# Patient Record
Sex: FEMALE | Race: WHITE | Employment: FULL TIME | URBAN - METROPOLITAN AREA
[De-identification: names, ages, dates, MRNs, and addresses within clinical notes are randomized per-mention and may not be internally consistent; named-entity substitution may affect disease eponyms.]

---
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
## 2018-03-02 ENCOUNTER — HOSPITAL ENCOUNTER (EMERGENCY)
Age: 18
Discharge: HOME | End: 2018-03-02
Attending: INTERNAL MEDICINE
Payer: SELF-PAY

## 2018-03-02 VITALS
HEART RATE: 84 BPM | SYSTOLIC BLOOD PRESSURE: 119 MMHG | HEIGHT: 67 IN | WEIGHT: 145 LBS | RESPIRATION RATE: 16 BRPM | BODY MASS INDEX: 22.76 KG/M2 | TEMPERATURE: 99.2 F | DIASTOLIC BLOOD PRESSURE: 55 MMHG | OXYGEN SATURATION: 100 %

## 2018-03-02 DIAGNOSIS — R19.7 NAUSEA VOMITING AND DIARRHEA: Primary | ICD-10-CM

## 2018-03-02 DIAGNOSIS — N39.0 UTI (URINARY TRACT INFECTION), UNCOMPLICATED: ICD-10-CM

## 2018-03-02 DIAGNOSIS — R11.2 NAUSEA VOMITING AND DIARRHEA: Primary | ICD-10-CM

## 2018-03-02 LAB
ALBUMIN SERPL-MCNC: 3.8 G/DL (ref 3.4–5)
ALBUMIN/GLOB SERPL: 1.1 {RATIO} (ref 0.8–1.7)
ALP SERPL-CCNC: 54 U/L (ref 45–117)
ALT SERPL-CCNC: 16 U/L (ref 13–56)
ANION GAP SERPL CALC-SCNC: 7 MMOL/L (ref 3–18)
APPEARANCE UR: ABNORMAL
AST SERPL-CCNC: 29 U/L (ref 15–37)
BACTERIA URNS QL MICRO: ABNORMAL /HPF
BASOPHILS # BLD: 0 K/UL (ref 0–0.06)
BASOPHILS NFR BLD: 0 % (ref 0–2)
BILIRUB SERPL-MCNC: 0.6 MG/DL (ref 0.2–1)
BILIRUB UR QL: NEGATIVE
BUN SERPL-MCNC: 11 MG/DL (ref 7–18)
BUN/CREAT SERPL: 15 (ref 12–20)
CALCIUM SERPL-MCNC: 8.3 MG/DL (ref 8.5–10.1)
CHLORIDE SERPL-SCNC: 105 MMOL/L (ref 100–108)
CO2 SERPL-SCNC: 27 MMOL/L (ref 21–32)
COLOR UR: YELLOW
CREAT SERPL-MCNC: 0.71 MG/DL (ref 0.6–1.3)
DIFFERENTIAL METHOD BLD: ABNORMAL
EOSINOPHIL # BLD: 0.1 K/UL (ref 0–0.4)
EOSINOPHIL NFR BLD: 2 % (ref 0–5)
EPITH CASTS URNS QL MICRO: ABNORMAL /LPF (ref 0–5)
ERYTHROCYTE [DISTWIDTH] IN BLOOD BY AUTOMATED COUNT: 11.6 % (ref 11.6–14.5)
GLOBULIN SER CALC-MCNC: 3.5 G/DL (ref 2–4)
GLUCOSE SERPL-MCNC: 92 MG/DL (ref 74–99)
GLUCOSE UR STRIP.AUTO-MCNC: NEGATIVE MG/DL
HCG UR QL: NEGATIVE
HCT VFR BLD AUTO: 43.8 % (ref 35–45)
HGB BLD-MCNC: 14.7 G/DL (ref 12–16)
HGB UR QL STRIP: NEGATIVE
KETONES UR QL STRIP.AUTO: ABNORMAL MG/DL
LEUKOCYTE ESTERASE UR QL STRIP.AUTO: ABNORMAL
LIPASE SERPL-CCNC: 117 U/L (ref 73–393)
LYMPHOCYTES # BLD: 1.1 K/UL (ref 0.9–3.6)
LYMPHOCYTES NFR BLD: 20 % (ref 21–52)
MAGNESIUM SERPL-MCNC: 1.9 MG/DL (ref 1.6–2.6)
MCH RBC QN AUTO: 30.4 PG (ref 24–34)
MCHC RBC AUTO-ENTMCNC: 33.6 G/DL (ref 31–37)
MCV RBC AUTO: 90.7 FL (ref 74–97)
MONOCYTES # BLD: 0.6 K/UL (ref 0.05–1.2)
MONOCYTES NFR BLD: 11 % (ref 3–10)
NEUTS SEG # BLD: 3.6 K/UL (ref 1.8–8)
NEUTS SEG NFR BLD: 67 % (ref 40–73)
NITRITE UR QL STRIP.AUTO: NEGATIVE
PH UR STRIP: 6.5 [PH] (ref 5–8)
PLATELET # BLD AUTO: 163 K/UL (ref 135–420)
PMV BLD AUTO: 10.2 FL (ref 9.2–11.8)
POTASSIUM SERPL-SCNC: 4.4 MMOL/L (ref 3.5–5.5)
PROT SERPL-MCNC: 7.3 G/DL (ref 6.4–8.2)
PROT UR STRIP-MCNC: NEGATIVE MG/DL
RBC # BLD AUTO: 4.83 M/UL (ref 4.2–5.3)
RBC #/AREA URNS HPF: ABNORMAL /HPF (ref 0–5)
SODIUM SERPL-SCNC: 139 MMOL/L (ref 136–145)
SP GR UR REFRACTOMETRY: 1.03 (ref 1–1.03)
UROBILINOGEN UR QL STRIP.AUTO: 1 EU/DL (ref 0.2–1)
WBC # BLD AUTO: 5.4 K/UL (ref 4.6–13.2)
WBC URNS QL MICRO: ABNORMAL /HPF (ref 0–5)

## 2018-03-02 PROCEDURE — 83735 ASSAY OF MAGNESIUM: CPT | Performed by: PHYSICIAN ASSISTANT

## 2018-03-02 PROCEDURE — 99282 EMERGENCY DEPT VISIT SF MDM: CPT

## 2018-03-02 PROCEDURE — 85025 COMPLETE CBC W/AUTO DIFF WBC: CPT | Performed by: PHYSICIAN ASSISTANT

## 2018-03-02 PROCEDURE — 80053 COMPREHEN METABOLIC PANEL: CPT | Performed by: PHYSICIAN ASSISTANT

## 2018-03-02 PROCEDURE — 96361 HYDRATE IV INFUSION ADD-ON: CPT

## 2018-03-02 PROCEDURE — 81025 URINE PREGNANCY TEST: CPT | Performed by: PHYSICIAN ASSISTANT

## 2018-03-02 PROCEDURE — 99283 EMERGENCY DEPT VISIT LOW MDM: CPT

## 2018-03-02 PROCEDURE — 96374 THER/PROPH/DIAG INJ IV PUSH: CPT

## 2018-03-02 PROCEDURE — 74011250636 HC RX REV CODE- 250/636: Performed by: PHYSICIAN ASSISTANT

## 2018-03-02 PROCEDURE — 81001 URINALYSIS AUTO W/SCOPE: CPT | Performed by: PHYSICIAN ASSISTANT

## 2018-03-02 PROCEDURE — 83690 ASSAY OF LIPASE: CPT | Performed by: PHYSICIAN ASSISTANT

## 2018-03-02 RX ORDER — ONDANSETRON 4 MG/1
4 TABLET, ORALLY DISINTEGRATING ORAL
Qty: 20 TAB | Refills: 0 | Status: SHIPPED | OUTPATIENT
Start: 2018-03-02 | End: 2021-01-27

## 2018-03-02 RX ORDER — ONDANSETRON 2 MG/ML
4 INJECTION INTRAMUSCULAR; INTRAVENOUS
Status: COMPLETED | OUTPATIENT
Start: 2018-03-02 | End: 2018-03-02

## 2018-03-02 RX ORDER — CIPROFLOXACIN 500 MG/1
500 TABLET ORAL 2 TIMES DAILY
Qty: 14 TAB | Refills: 0 | Status: SHIPPED | OUTPATIENT
Start: 2018-03-02 | End: 2018-03-09

## 2018-03-02 RX ADMIN — SODIUM CHLORIDE 1000 ML: 900 INJECTION, SOLUTION INTRAVENOUS at 19:16

## 2018-03-02 RX ADMIN — ONDANSETRON 4 MG: 2 INJECTION INTRAMUSCULAR; INTRAVENOUS at 19:58

## 2018-03-02 NOTE — LETTER
[facility] [place] 
[facility] 
[address] [address] 
[phone] Work/School Note Date: 3/2/2018 To Whom It May concern: 
 
Nicol Cox was seen and treated today in the emergency room by the following provider(s): 
Attending Provider: Shaun Collins MD 
Physician Assistant: LETICIA Bhat. Nicol Cox may return to work on 3/5/18.  
 
Sincerely, 
 
 
 
 
Brittany Urrutia PA-C

## 2018-03-03 NOTE — ED TRIAGE NOTES
N/V/D. Diarrhea began yesterday. Vomiting since 4 am today. Sepsis Screening completed    (  )Patient meets SIRS criteria. ( xxx )Patient does not meet SIRS criteria.       SIRS Criteria is achieved when two or more of the following are present   Temperature < 96.8°F (36°C) or > 100.9°F (38.3°C)   Heart Rate > 90 beats per minute   Respiratory Rate > 20 breaths per minute   WBC count > 12,000 or <4,000 or > 10% bands

## 2018-03-03 NOTE — DISCHARGE INSTRUCTIONS
Learning About Clear Liquid Diets  Introduction    Sometimes you have to stop eating solid food for a while. This may be because you're preparing for a medical procedure or recovering from one. Or solid food might irritate your digestive tract because of illness. But even when you can't eat solid food, your body still needs liquids and energy. A clear liquid diet gives your body basic nutrition until you can go back to eating solid food. Your body digests these foods easily, and these foods don't leave behind any solids as they pass through your system. The clear liquid diet helps reduce the nausea and diarrhea you may have while you're ill. A clear liquid diet isn't very satisfying. And it doesn't supply all the nutrients you need over the long term. But you will be on this diet for just a day or two. After that, you will probably start eating bland foods. Soon you'll be able to return to your regular diet. Examples  A clear liquid diet may include:  · Clear, fat-free broth and bouillon. · Water, ice chips, and flavored ice pops. · Strained fruit juices and flavored fruit drinks. · Fruit-flavored gelatin, like Jell-O.  · Soft drinks like lemon-lime soda and ginger ale. · Sweetened coffee or tea without cream.  Where can you learn more? Go to http://stevan-jose.info/. Enter K497 in the search box to learn more about \"Learning About Clear Liquid Diets. \"  Current as of: May 12, 2017  Content Version: 11.4  © 3364-3197 [employer]. Care instructions adapted under license by [employer] (which disclaims liability or warranty for this information). If you have questions about a medical condition or this instruction, always ask your healthcare professional. [place] [address] any warranty or liability for your use of this information.      Urinary Tract Infection in Women: Care Instructions  Your Care Instructions    A urinary tract infection, or UTI, is a general term for an infection anywhere between the kidneys and the urethra (where urine comes out). Most UTIs are bladder infections. They often cause pain or burning when you urinate. UTIs are caused by bacteria and can be cured with antibiotics. Be sure to complete your treatment so that the infection goes away. Follow-up care is a key part of your treatment and safety. Be sure to make and go to all appointments, and call your doctor if you are having problems. It's also a good idea to know your test results and keep a list of the medicines you take. How can you care for yourself at home? · Take your antibiotics as directed. Do not stop taking them just because you feel better. You need to take the full course of antibiotics. · Drink extra water and other fluids for the next day or two. This may help wash out the bacteria that are causing the infection. (If you have kidney, heart, or liver disease and have to limit fluids, talk with your doctor before you increase your fluid intake.)  · Avoid drinks that are carbonated or have caffeine. They can irritate the bladder. · Urinate often. Try to empty your bladder each time. · To relieve pain, take a hot bath or lay a heating pad set on low over your lower belly or genital area. Never go to sleep with a heating pad in place. To prevent UTIs  · Drink plenty of water each day. This helps you urinate often, which clears bacteria from your system. (If you have kidney, heart, or liver disease and have to limit fluids, talk with your doctor before you increase your fluid intake.)  · Urinate when you need to. · Urinate right after you have sex. · Change sanitary pads often. · Avoid douches, bubble baths, feminine hygiene sprays, and other feminine hygiene products that have deodorants. · After going to the bathroom, wipe from front to back. When should you call for help? Call your doctor now or seek immediate medical care if:  ?  · Symptoms such as fever, chills, nausea, or vomiting get worse or appear for the first time. ? · You have new pain in your back just below your rib cage. This is called flank pain. ? · There is new blood or pus in your urine. ? · You have any problems with your antibiotic medicine. ? Watch closely for changes in your health, and be sure to contact your doctor if:  ? · You are not getting better after taking an antibiotic for 2 days. ? · Your symptoms go away but then come back. Where can you learn more? Go to http://stevan-jose.info/. Enter Z648 in the search box to learn more about \"Urinary Tract Infection in Women: Care Instructions. \"  Current as of: May 12, 2017  Content Version: 11.4  © 7738-1849 [employer]. Care instructions adapted under license by [employer] (which disclaims liability or warranty for this information). If you have questions about a medical condition or this instruction, always ask your healthcare professional. [place] [address] any warranty or liability for your use of this information. Nausea and Vomiting: Care Instructions  Your Care Instructions    When you are nauseated, you may feel weak and sweaty and notice a lot of saliva in your mouth. Nausea often leads to vomiting. Most of the time you do not need to worry about nausea and vomiting, but they can be signs of other illnesses. Two common causes of nausea and vomiting are stomach flu and food poisoning. Nausea and vomiting from viral stomach flu will usually start to improve within 24 hours. Nausea and vomiting from food poisoning may last from 12 to 48 hours. The doctor has checked you carefully, but problems can develop later. If you notice any problems or new symptoms, get medical treatment right away. Follow-up care is a key part of your treatment and safety. Be sure to make and go to all appointments, and call your doctor if you are having problems.  It's also a good idea to know your test results and keep a list of the medicines you take. How can you care for yourself at home? · To prevent dehydration, drink plenty of fluids, enough so that your urine is light yellow or clear like water. Choose water and other caffeine-free clear liquids until you feel better. If you have kidney, heart, or liver disease and have to limit fluids, talk with your doctor before you increase the amount of fluids you drink. · Rest in bed until you feel better. · When you are able to eat, try clear soups, mild foods, and liquids until all symptoms are gone for 12 to 48 hours. Other good choices include dry toast, crackers, cooked cereal, and gelatin dessert, such as Jell-O. When should you call for help? Call 911 anytime you think you may need emergency care. For example, call if:  ? · You passed out (lost consciousness). ?Call your doctor now or seek immediate medical care if:  ? · You have symptoms of dehydration, such as:  ¨ Dry eyes and a dry mouth. ¨ Passing only a little dark urine. ¨ Feeling thirstier than usual.   ? · You have new or worsening belly pain. ? · You have a new or higher fever. ? · You vomit blood or what looks like coffee grounds. ? Watch closely for changes in your health, and be sure to contact your doctor if:  ? · You have ongoing nausea and vomiting. ? · Your vomiting is getting worse. ? · Your vomiting lasts longer than 2 days. ? · You are not getting better as expected. Where can you learn more? Go to http://stevan-jose.info/. Enter 25 [id number] in the search box to learn more about \"Nausea and Vomiting: Care Instructions. \"  Current as of: March 20, 2017  Content Version: 11.4  © 8747-8543 [employer]. Care instructions adapted under license by [employer] (which disclaims liability or warranty for this information).  If you have questions about a medical condition or this instruction, always ask your healthcare professional. [address] any warranty or liability for your use of this information.

## 2018-03-03 NOTE — ED PROVIDER NOTES
EMERGENCY DEPARTMENT HISTORY AND PHYSICAL EXAM    Date: 3/2/2018  Patient Name: Cynthia Benites    History of Presenting Illness     Chief Complaint   Patient presents with    GI Problem         History Provided By: Patient    Chief Complaint: N/V  Duration: 16 hours ago  Timing:  Constant  Severity: 9 out of 10  Associated Symptoms: subjective fever, chills, diarrhea, and HA    Additional History (Context):   8:04 PM  Cynthia Benites is a 19 y.o. female presents to the emergency department C/O constant n/v, onset 16 hours ago. Associated sxs include subjective fever, chills, gradually improving diarrhea that started yesterday, and HA. 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 LNMP 2/7/18. Pt denies eating questionable foods, previous abd surgeries, bloody stool, and any other sxs or complaints. PCP: Nicole Campbell MD    Current Outpatient Prescriptions   Medication Sig Dispense Refill    ciprofloxacin HCl (CIPRO) 500 mg tablet Take 1 Tab by mouth two (2) times a day for 7 days. 14 Tab 0    ondansetron (ZOFRAN ODT) 4 mg disintegrating tablet Take 1 Tab by mouth every eight (8) hours as needed for Nausea. 20 Tab 0       Past History     Past Medical History:  Past Medical History:   Diagnosis Date    Ovarian cyst        Past Surgical History:  History reviewed. No pertinent surgical history. Family History:  History reviewed. No pertinent family history. Social History:  Social History   Substance Use Topics    Smoking status: Current Every Day Smoker     Packs/day: 0.50    Smokeless tobacco: Never Used    Alcohol use No       Allergies: Allergies   Allergen Reactions    Amoxicillin Hives and Swelling         Review of Systems   Review of Systems   Constitutional: Positive for chills and fever (subjective). Gastrointestinal: Positive for diarrhea, nausea and vomiting. Negative for blood in stool. Neurological: Positive for headaches.    All other systems reviewed and are negative. Physical Exam     Vitals:    03/02/18 1913   BP: 107/66   Pulse: 92   Resp: 16   Temp: 99.2 °F (37.3 °C)   SpO2: 100%   Weight: 65.8 kg (145 lb)   Height: 5' 7\" (1.702 m)     Physical Exam   Constitutional: She is oriented to person, place, and time. She appears well-developed and well-nourished. No distress. HENT:   Head: Normocephalic and atraumatic. Eyes: Conjunctivae and EOM are normal. Pupils are equal, round, and reactive to light. Neck: Normal range of motion. Neck supple. Cardiovascular: Normal rate and regular rhythm. Pulmonary/Chest: Effort normal and breath sounds normal.   Abdominal: Soft. Bowel sounds are normal. She exhibits no distension. There is no tenderness. There is no rebound and no guarding. Musculoskeletal: Normal range of motion. Neurological: She is alert and oriented to person, place, and time. Skin: Skin is warm and dry. Psychiatric: She has a normal mood and affect. Her behavior is normal.   Nursing note and vitals reviewed.         Diagnostic Study Results     Labs -     Recent Results (from the past 12 hour(s))   URINALYSIS W/ RFLX MICROSCOPIC    Collection Time: 03/02/18  7:16 PM   Result Value Ref Range    Color YELLOW      Appearance CLOUDY      Specific gravity 1.028 1.005 - 1.030      pH (UA) 6.5 5.0 - 8.0      Protein NEGATIVE  NEG mg/dL    Glucose NEGATIVE  NEG mg/dL    Ketone TRACE (A) NEG mg/dL    Bilirubin NEGATIVE  NEG      Blood NEGATIVE  NEG      Urobilinogen 1.0 0.2 - 1.0 EU/dL    Nitrites NEGATIVE  NEG      Leukocyte Esterase TRACE (A) NEG     HCG URINE, QL    Collection Time: 03/02/18  7:16 PM   Result Value Ref Range    HCG urine, QL NEGATIVE  NEG     URINE MICROSCOPIC ONLY    Collection Time: 03/02/18  7:16 PM   Result Value Ref Range    WBC 2 to 6 0 - 5 /hpf    RBC 0 to 3 0 - 5 /hpf    Epithelial cells 2+ 0 - 5 /lpf    Bacteria 2+ (A) NEG /hpf   CBC WITH AUTOMATED DIFF    Collection Time: 03/02/18  7:45 PM   Result Value Ref Range WBC 5.4 4.6 - 13.2 K/uL    RBC 4.83 4.20 - 5.30 M/uL    HGB 14.7 12.0 - 16.0 g/dL    HCT 43.8 35.0 - 45.0 %    MCV 90.7 74.0 - 97.0 FL    MCH 30.4 24.0 - 34.0 PG    MCHC 33.6 31.0 - 37.0 g/dL    RDW 11.6 11.6 - 14.5 %    PLATELET 954 578 - 310 K/uL    MPV 10.2 9.2 - 11.8 FL    NEUTROPHILS 67 40 - 73 %    LYMPHOCYTES 20 (L) 21 - 52 %    MONOCYTES 11 (H) 3 - 10 %    EOSINOPHILS 2 0 - 5 %    BASOPHILS 0 0 - 2 %    ABS. NEUTROPHILS 3.6 1.8 - 8.0 K/UL    ABS. LYMPHOCYTES 1.1 0.9 - 3.6 K/UL    ABS. MONOCYTES 0.6 0.05 - 1.2 K/UL    ABS. EOSINOPHILS 0.1 0.0 - 0.4 K/UL    ABS. BASOPHILS 0.0 0.0 - 0.06 K/UL    DF AUTOMATED     METABOLIC PANEL, COMPREHENSIVE    Collection Time: 03/02/18  7:45 PM   Result Value Ref Range    Sodium 139 136 - 145 mmol/L    Potassium 4.4 3.5 - 5.5 mmol/L    Chloride 105 100 - 108 mmol/L    CO2 27 21 - 32 mmol/L    Anion gap 7 3.0 - 18 mmol/L    Glucose 92 74 - 99 mg/dL    BUN 11 7.0 - 18 MG/DL    Creatinine 0.71 0.6 - 1.3 MG/DL    BUN/Creatinine ratio 15 12 - 20      GFR est AA >60 >60 ml/min/1.73m2    GFR est non-AA >60 >60 ml/min/1.73m2    Calcium 8.3 (L) 8.5 - 10.1 MG/DL    Bilirubin, total 0.6 0.2 - 1.0 MG/DL    ALT (SGPT) 16 13 - 56 U/L    AST (SGOT) 29 15 - 37 U/L    Alk. phosphatase 54 45 - 117 U/L    Protein, total 7.3 6.4 - 8.2 g/dL    Albumin 3.8 3.4 - 5.0 g/dL    Globulin 3.5 2.0 - 4.0 g/dL    A-G Ratio 1.1 0.8 - 1.7     LIPASE    Collection Time: 03/02/18  7:45 PM   Result Value Ref Range    Lipase 117 73 - 393 U/L       Radiologic Studies -   No orders to display     CT Results  (Last 48 hours)    None        CXR Results  (Last 48 hours)    None          MEDICATIONS GIVEN:  Medications   sodium chloride 0.9 % bolus infusion 1,000 mL (1,000 mL IntraVENous New Bag 3/2/18 1916)   ondansetron (ZOFRAN) injection 4 mg (4 mg IntraVENous Given 3/2/18 1958)        Medical Decision Making   I am the first provider for this patient.     I reviewed the vital signs, available nursing notes, past medical history, past surgical history, family history and social history. Vital Signs-Reviewed the patient's vital signs. Pulse Oximetry Analysis - 100% on RA     Records Reviewed: Nursing Notes    Provider Notes (Medical Decision Making):     Procedures:  Procedures    ED Course:   8:04 PM Initial assessment performed. The patients presenting problems have been discussed, and they are in agreement with the care plan formulated and outlined with them. I have encouraged them to ask questions as they arise throughout their visit. Diagnosis and Disposition       DISCHARGE NOTE:  8:38 PM  Wanda Moe's  results have been reviewed with her. She has been counseled regarding her diagnosis, treatment, and plan. She verbally conveys understanding and agreement of the signs, symptoms, diagnosis, treatment and prognosis and additionally agrees to follow up as discussed. She also agrees with the care-plan and conveys that all of her questions have been answered. I have also provided discharge instructions for her that include: educational information regarding their diagnosis and treatment, and list of reasons why they would want to return to the ED prior to their follow-up appointment, should her condition change. She has been provided with education for proper emergency department utilization. CLINICAL IMPRESSION:    1. Nausea vomiting and diarrhea    2. UTI (urinary tract infection), uncomplicated        PLAN:  1. D/C Home  2. Current Discharge Medication List      START taking these medications    Details   ciprofloxacin HCl (CIPRO) 500 mg tablet Take 1 Tab by mouth two (2) times a day for 7 days. Qty: 14 Tab, Refills: 0      ondansetron (ZOFRAN ODT) 4 mg disintegrating tablet Take 1 Tab by mouth every eight (8) hours as needed for Nausea. Qty: 20 Tab, Refills: 0           3.    Follow-up Information     Follow up With Details Comments Contact Helen Weber MD Schedule an appointment as soon as possible for a visit in 2 days  [address]      [facility] EM[facility]  As needed, if symptoms worsen [address] [place] [id number]  [phone]          _______________________________   Attestations:     SCRIBE ATTESTATION:  This note is prepared by Kosta Santiago, acting as Scribe for Bebeto Barnett PA-C.    PROVIDER ATTESTATION:  Bebeto Barnett PA-C: The scribe's documentation has been prepared under my direction and personally reviewed by me in its entirety.  I confirm that the note above accurately reflects all work, treatment, procedures, and medical decision making performed by me.   _______________________________

## 2019-07-14 ENCOUNTER — HOSPITAL ENCOUNTER (EMERGENCY)
Age: 19
Discharge: HOME | End: 2019-07-14
Attending: EMERGENCY MEDICINE
Payer: SELF-PAY

## 2019-07-14 VITALS
OXYGEN SATURATION: 100 % | BODY MASS INDEX: 24.33 KG/M2 | SYSTOLIC BLOOD PRESSURE: 115 MMHG | HEART RATE: 98 BPM | DIASTOLIC BLOOD PRESSURE: 80 MMHG | TEMPERATURE: 98.9 F | WEIGHT: 155 LBS | RESPIRATION RATE: 16 BRPM | HEIGHT: 67 IN

## 2019-07-14 DIAGNOSIS — B34.9 VIRAL SYNDROME: Primary | ICD-10-CM

## 2019-07-14 PROCEDURE — 99282 EMERGENCY DEPT VISIT SF MDM: CPT

## 2019-07-14 RX ORDER — LORATADINE AND PSEUDOEPHEDRINE 10; 240 MG/1; MG/1
1 TABLET, EXTENDED RELEASE ORAL DAILY
Qty: 12 TAB | Refills: 0 | Status: SHIPPED | OUTPATIENT
Start: 2019-07-14 | End: 2021-01-27

## 2019-07-14 RX ORDER — FLUTICASONE PROPIONATE 50 MCG
2 SPRAY, SUSPENSION (ML) NASAL DAILY
Qty: 1 BOTTLE | Refills: 0 | Status: SHIPPED | OUTPATIENT
Start: 2019-07-14 | End: 2021-01-27

## 2019-07-14 RX ORDER — NAPROXEN 500 MG/1
500 TABLET ORAL 2 TIMES DAILY WITH MEALS
Qty: 20 TAB | Refills: 0 | Status: SHIPPED | OUTPATIENT
Start: 2019-07-14 | End: 2019-07-24

## 2019-07-14 NOTE — ED PROVIDER NOTES
EMERGENCY DEPARTMENT HISTORY AND PHYSICAL EXAM    Date: 7/14/2019  Patient Name: Alan Reaves    History of Presenting Illness     Chief Complaint   Patient presents with    Nasal Congestion    Sore Throat    Fever         History Provided By: Patient    Alan Reaves is a 22 y.o. female who presents to the emergency department C/O congestion. Associated sxs include runny nose, postnasal drainage, sore throat. Patient reports 24 hours of head congestion postnasal drip sore throat. Patient endorses a sick contacts she has a child at home with similar cold-like symptoms. She states she was unable to go to work today which from to the ER visit. Patient reports having history of strep throat and today's sore throat feels nothing like that. Pt denies fever, cough, nausea vomiting, and any other sxs or complaints. PCP: Jackie Peterson MD    Current Outpatient Medications   Medication Sig Dispense Refill    loratadine-pseudoephedrine (CLARITIN-D 24 HOUR)  mg per tablet Take 1 Tab by mouth daily. 12 Tab 0    fluticasone propionate (FLONASE) 50 mcg/actuation nasal spray 2 Sprays by Both Nostrils route daily. 1 Bottle 0    naproxen (NAPROSYN) 500 mg tablet Take 1 Tab by mouth two (2) times daily (with meals) for 10 days. 20 Tab 0    ondansetron (ZOFRAN ODT) 4 mg disintegrating tablet Take 1 Tab by mouth every eight (8) hours as needed for Nausea. 20 Tab 0       Past History     Past Medical History:  Past Medical History:   Diagnosis Date    Ovarian cyst        Past Surgical History:  History reviewed. No pertinent surgical history. Family History:  History reviewed. No pertinent family history. Social History:  Social History     Tobacco Use    Smoking status: Current Every Day Smoker     Packs/day: 0.50    Smokeless tobacco: Never Used   Substance Use Topics    Alcohol use: No    Drug use: Not on file       Allergies:   Allergies   Allergen Reactions    Amoxicillin Hives and Swelling         Review of Systems   Review of Systems   Constitutional: Negative for fever. HENT: Positive for congestion, postnasal drip, rhinorrhea and sore throat. Respiratory: Negative for cough. Skin: Negative for rash. All other systems reviewed and are negative. Physical Exam     Vitals:    07/14/19 1059   BP: 115/80   Pulse: 98   Resp: 16   Temp: 98.9 °F (37.2 °C)   SpO2: 100%   Weight: 70.3 kg (155 lb)   Height: 5' 7\" (1.702 m)     Physical Exam   Constitutional: She is oriented to person, place, and time. She appears well-developed and well-nourished. No distress. HENT:   Head: Normocephalic and atraumatic. Right Ear: External ear normal.   Mouth/Throat: Oropharynx is clear and moist. No oropharyngeal exudate. Audible nasal congestion noted   Eyes: Pupils are equal, round, and reactive to light. Conjunctivae and EOM are normal.   Neck: Normal range of motion. Neck supple. Cardiovascular: Normal rate and regular rhythm. Pulmonary/Chest: Effort normal and breath sounds normal.   Musculoskeletal: Normal range of motion. Lymphadenopathy:     She has no cervical adenopathy. Neurological: She is alert and oriented to person, place, and time. Skin: Skin is warm and dry. Psychiatric: She has a normal mood and affect. Her behavior is normal.   Nursing note and vitals reviewed. Diagnostic Study Results     Labs -   No results found for this or any previous visit (from the past 12 hour(s)). Radiologic Studies -   No orders to display     CT Results  (Last 48 hours)    None        CXR Results  (Last 48 hours)    None          Medications given in the ED-  Medications - No data to display      Medical Decision Making   I am the first provider for this patient. I reviewed the vital signs, available nursing notes, past medical history, past surgical history, family history and social history. Vital Signs-Reviewed the patient's vital signs.     Records Reviewed: Nursing Notes    Procedures:  Procedures    ED Course:   11:12 AM   Initial assessment performed. The patients presenting problems have been discussed, and they are in agreement with the care plan formulated and outlined with them. I have encouraged them to ask questions as they arise throughout their visit. Patient offered strep throat testing with a pharyngeal swab patient politely declined. Discussion: 22 y.o. female 24 hours of URI type symptoms. Patient afebrile nontoxic-appearing exam consistent with likely viral illness. Will plan for supportive care and PCP follow-up with strict return precautions discussed. Work note provided at patient request        Diagnosis and Disposition       DISCHARGE NOTE:  Liliana Blue  results have been reviewed with her. She has been counseled regarding her diagnosis, treatment, and plan. She verbally conveys understanding and agreement of the signs, symptoms, diagnosis, treatment and prognosis and additionally agrees to follow up as discussed. She also agrees with the care-plan and conveys that all of her questions have been answered. I have also provided discharge instructions for her that include: educational information regarding their diagnosis and treatment, and list of reasons why they would want to return to the ED prior to their follow-up appointment, should her condition change. She has been provided with education for proper emergency department utilization. CLINICAL IMPRESSION:    1. Viral syndrome        PLAN:  1. D/C Home  2. Current Discharge Medication List      START taking these medications    Details   loratadine-pseudoephedrine (CLARITIN-D 24 HOUR)  mg per tablet Take 1 Tab by mouth daily. Qty: 12 Tab, Refills: 0      fluticasone propionate (FLONASE) 50 mcg/actuation nasal spray 2 Sprays by Both Nostrils route daily.   Qty: 1 Bottle, Refills: 0      naproxen (NAPROSYN) 500 mg tablet Take 1 Tab by mouth two (2) times daily (with meals) for 10 days. Qty: 20 Tab, Refills: 0           3. Follow-up Information     Follow up With Specialties Details Why Contact Info    Kasi Cabrera MD Family Practice Schedule an appointment as soon as possible for a visit  [address] [place] [address]      [facility] EMERGENCY DEPT Emergency Medicine  As needed, If symptoms worsen [address] [place] [address]  [phone]              Please note that this dictation was completed with PROSimity, the computer voice recognition software. Quite often unanticipated grammatical, syntax, homophones, and other interpretive errors are inadvertently transcribed by the computer software. Please disregard these errors. Please excuse any errors that have escaped final proofreading.

## 2019-07-14 NOTE — LETTER
[facility] [place] 
[facility] EMERGENCY DEPT 
[address] [address] 
[phone] Work/School Note Date: 7/14/2019 To Whom It May concern: 
 
Jaqueline Fleischer was seen and treated today in the emergency room by the following provider(s): 
Attending Provider: Faye Sosa MD 
Physician Assistant: LETICIA Brown. Jaqueline Fleischer may return to work on 7/16/19 Sincerely, 
 
 
 
 
LETICIA Dumas

## 2019-07-14 NOTE — DISCHARGE INSTRUCTIONS
Patient Education        Viral Infections: Care Instructions  Your Care Instructions    You don't feel well, but it's not clear what's causing it. You may have a viral infection. Viruses cause many illnesses, such as the common cold, influenza, fever, rashes, and the diarrhea, nausea, and vomiting that are often called \"stomach flu. \" You may wonder if antibiotic medicines could make you feel better. But antibiotics only treat infections caused by bacteria. They don't work on viruses. The good news is that viral infections usually aren't serious. Most will go away in a few days without medical treatment. In the meantime, there are a few things you can do to make yourself more comfortable. Follow-up care is a key part of your treatment and safety. Be sure to make and go to all appointments, and call your doctor if you are having problems. It's also a good idea to know your test results and keep a list of the medicines you take. How can you care for yourself at home? · Get plenty of rest if you feel tired. · Take an over-the-counter pain medicine if needed, such as acetaminophen (Tylenol), ibuprofen (Advil, Motrin), or naproxen (Aleve). Read and follow all instructions on the label. · Be careful when taking over-the-counter cold or flu medicines and Tylenol at the same time. Many of these medicines have acetaminophen, which is Tylenol. Read the labels to make sure that you are not taking more than the recommended dose. Too much acetaminophen (Tylenol) can be harmful. · Drink plenty of fluids, enough so that your urine is light yellow or clear like water. If you have kidney, heart, or liver disease and have to limit fluids, talk with your doctor before you increase the amount of fluids you drink. · Stay home from work, school, and other public places while you have a fever. When should you call for help? Call 911 anytime you think you may need emergency care.  For example, call if:    · You have severe trouble breathing.     · You passed out (lost consciousness).    Call your doctor now or seek immediate medical care if:    · You seem to be getting much sicker.     · You have a new or higher fever.     · You have blood in your stools.     · You have new belly pain, or your pain gets worse.     · You have a new rash.    Watch closely for changes in your health, and be sure to contact your doctor if:    · You start to get better and then get worse.     · You do not get better as expected. Where can you learn more? Go to http://stevan-jose.info/. Enter I069 in the search box to learn more about \"Viral Infections: Care Instructions. \"  Current as of: July 30, 2018  Content Version: 11.9  © 2809-3181 [employer], [employer]. Care instructions adapted under license by [employer] (which disclaims liability or warranty for this information). If you have questions about a medical condition or this instruction, always ask your healthcare professional. [address] any warranty or liability for your use of this information.

## 2021-01-27 ENCOUNTER — APPOINTMENT (OUTPATIENT)
Dept: ULTRASOUND IMAGING | Age: 21
End: 2021-01-27
Attending: PHYSICIAN ASSISTANT
Payer: MEDICAID

## 2021-01-27 ENCOUNTER — HOSPITAL ENCOUNTER (EMERGENCY)
Age: 21
Discharge: HOME | End: 2021-01-27
Attending: EMERGENCY MEDICINE
Payer: MEDICAID

## 2021-01-27 VITALS
HEART RATE: 81 BPM | WEIGHT: 154 LBS | BODY MASS INDEX: 24.17 KG/M2 | DIASTOLIC BLOOD PRESSURE: 69 MMHG | HEIGHT: 67 IN | OXYGEN SATURATION: 99 % | TEMPERATURE: 98.2 F | RESPIRATION RATE: 18 BRPM | SYSTOLIC BLOOD PRESSURE: 121 MMHG

## 2021-01-27 DIAGNOSIS — O21.9 NAUSEA AND VOMITING DURING PREGNANCY PRIOR TO 22 WEEKS GESTATION: ICD-10-CM

## 2021-01-27 DIAGNOSIS — O20.0 THREATENED MISCARRIAGE IN EARLY PREGNANCY: Primary | ICD-10-CM

## 2021-01-27 LAB
ABO + RH BLD: NORMAL
ANION GAP SERPL CALC-SCNC: 4 MMOL/L (ref 3–18)
APPEARANCE UR: CLEAR
BASOPHILS # BLD: 0 K/UL (ref 0–0.1)
BASOPHILS NFR BLD: 0 % (ref 0–2)
BILIRUB UR QL: NEGATIVE
BUN SERPL-MCNC: 5 MG/DL (ref 7–18)
BUN/CREAT SERPL: 8 (ref 12–20)
CALCIUM SERPL-MCNC: 9.9 MG/DL (ref 8.5–10.1)
CHLORIDE SERPL-SCNC: 105 MMOL/L (ref 100–111)
CO2 SERPL-SCNC: 27 MMOL/L (ref 21–32)
COLOR UR: YELLOW
CREAT SERPL-MCNC: 0.63 MG/DL (ref 0.6–1.3)
DIFFERENTIAL METHOD BLD: ABNORMAL
EOSINOPHIL # BLD: 0 K/UL (ref 0–0.4)
EOSINOPHIL NFR BLD: 0 % (ref 0–5)
ERYTHROCYTE [DISTWIDTH] IN BLOOD BY AUTOMATED COUNT: 11.8 % (ref 11.6–14.5)
GLUCOSE SERPL-MCNC: 83 MG/DL (ref 74–99)
GLUCOSE UR STRIP.AUTO-MCNC: NEGATIVE MG/DL
HCG SERPL-ACNC: 4187 MIU/ML (ref 0–10)
HCG UR QL: POSITIVE
HCT VFR BLD AUTO: 44.2 % (ref 35–45)
HGB BLD-MCNC: 15.2 G/DL (ref 12–16)
HGB UR QL STRIP: NEGATIVE
KETONES UR QL STRIP.AUTO: 40 MG/DL
LEUKOCYTE ESTERASE UR QL STRIP.AUTO: NEGATIVE
LYMPHOCYTES # BLD: 1.4 K/UL (ref 0.9–3.6)
LYMPHOCYTES NFR BLD: 19 % (ref 21–52)
MCH RBC QN AUTO: 30.9 PG (ref 24–34)
MCHC RBC AUTO-ENTMCNC: 34.4 G/DL (ref 31–37)
MCV RBC AUTO: 89.8 FL (ref 74–97)
MONOCYTES # BLD: 0.4 K/UL (ref 0.05–1.2)
MONOCYTES NFR BLD: 5 % (ref 3–10)
NEUTS SEG # BLD: 5.5 K/UL (ref 1.8–8)
NEUTS SEG NFR BLD: 76 % (ref 40–73)
NITRITE UR QL STRIP.AUTO: NEGATIVE
PH UR STRIP: 7 [PH] (ref 5–8)
PLATELET # BLD AUTO: 268 K/UL (ref 135–420)
PMV BLD AUTO: 10.3 FL (ref 9.2–11.8)
POTASSIUM SERPL-SCNC: 3.6 MMOL/L (ref 3.5–5.5)
PROT UR STRIP-MCNC: NEGATIVE MG/DL
RBC # BLD AUTO: 4.92 M/UL (ref 4.2–5.3)
SERVICE CMNT-IMP: NORMAL
SODIUM SERPL-SCNC: 136 MMOL/L (ref 136–145)
SP GR UR REFRACTOMETRY: 1.01 (ref 1–1.03)
UROBILINOGEN UR QL STRIP.AUTO: 0.2 EU/DL (ref 0.2–1)
WBC # BLD AUTO: 7.4 K/UL (ref 4.6–13.2)
WET PREP GENITAL: NORMAL

## 2021-01-27 PROCEDURE — 87210 SMEAR WET MOUNT SALINE/INK: CPT

## 2021-01-27 PROCEDURE — 87591 N.GONORRHOEAE DNA AMP PROB: CPT

## 2021-01-27 PROCEDURE — 84702 CHORIONIC GONADOTROPIN TEST: CPT

## 2021-01-27 PROCEDURE — 99284 EMERGENCY DEPT VISIT MOD MDM: CPT

## 2021-01-27 PROCEDURE — 85025 COMPLETE CBC W/AUTO DIFF WBC: CPT

## 2021-01-27 PROCEDURE — 86900 BLOOD TYPING SEROLOGIC ABO: CPT

## 2021-01-27 PROCEDURE — 93975 VASCULAR STUDY: CPT

## 2021-01-27 PROCEDURE — 81003 URINALYSIS AUTO W/O SCOPE: CPT

## 2021-01-27 PROCEDURE — 81025 URINE PREGNANCY TEST: CPT

## 2021-01-27 PROCEDURE — 80048 BASIC METABOLIC PNL TOTAL CA: CPT

## 2021-01-27 RX ORDER — ONDANSETRON 4 MG/1
4 TABLET, ORALLY DISINTEGRATING ORAL
Qty: 20 TAB | Refills: 0 | Status: SHIPPED | OUTPATIENT
Start: 2021-01-27

## 2021-01-27 NOTE — LETTER
NOTIFICATION RETURN TO WORK / SCHOOL 
 
1/27/2021 5:43 PM 
 
Ms. hCris Rodo Gloria 
[address] [place] [id number] To Whom It May Concern: 
 
Eldno Wilburn is currently under the care of [facility] EMERGENCY DEPT. She will return to work/school on:1/30/2021 If there are questions or concerns please have the patient contact our office. Sincerely, 
 
 
Jameel Cartagena

## 2021-01-27 NOTE — ED NOTES
Verbal shift change report given to Ansley Henao RN (oncoming nurse) by Dino Montejo RN (offgoing nurse). Report included the following information SBAR, Kardex, ED Summary, [facility] and Recent Results.

## 2021-01-27 NOTE — ED PROVIDER NOTES
EMERGENCY DEPARTMENT HISTORY AND PHYSICAL EXAM    Date: 1/27/2021  Patient Name: Wanda Moe    History of Presenting Illness     Chief Complaint   Patient presents with   • Pregnancy Problem         History Provided By: Patient    2:01 PM  Wanda Moe is a 21 y.o. female who presents to the emergency department C/O pelvic cramping and vaginal spotting which began yesterday.  Patient states her last menstrual period was 12/17/2020, has had positive home pregnancy test.  Does not know her blood type.  This would be her first pregnancy.  Spotting less today, appeared brownish per patient. Pt denies fever, nausea, vomiting, diarrhea, constipation, dysuria, concerns for STDs and any other sxs or complaints.     PCP: Other, MD Tanja        Past History     Past Medical History:  Past Medical History:   Diagnosis Date   • Ovarian cyst        Past Surgical History:  History reviewed. No pertinent surgical history.    Family History:  History reviewed. No pertinent family history.    Social History:  Social History     Tobacco Use   • Smoking status: Current Every Day Smoker     Packs/day: 0.50   • Smokeless tobacco: Never Used   Substance Use Topics   • Alcohol use: No   • Drug use: Never       Allergies:  Allergies   Allergen Reactions   • Amoxicillin Hives and Swelling         Review of Systems   Review of Systems   Constitutional: Negative for fever.   Gastrointestinal: Positive for abdominal pain. Negative for diarrhea, nausea and vomiting.   Genitourinary: Positive for vaginal bleeding. Negative for dysuria.   All other systems reviewed and are negative.        Physical Exam     Vitals:    01/27/21 1329   BP: 121/69   Pulse: 81   Resp: 18   Temp: 98.2 °F (36.8 °C)   SpO2: 99%   Weight: 69.9 kg (154 lb)   Height: 5' 7\" (1.702 m)     Physical Exam  Vital signs and nursing notes reviewed.    CONSTITUTIONAL: Alert. Well-appearing; well-nourished; in no apparent distress.  HEAD: Normocephalic; atraumatic.          CV: Normal S1, S2; no murmurs, rubs, or gallops. RESPIRATORY: Normal chest excursion with respiration; breath sounds clear and equal bilaterally; no wheezes, rhonchi, or rales. GI: Normal bowel sounds; non-distended; +mild suprapubic tenderness; no guarding or rigidity; no palpable organomegaly. No CVA tenderness. PELVIC: Normal external genitalia, without rash or lesions. Speculum exam: Normal appearing cervix. No discharge noted. Vaginal walls without lesion. Bimanual exam: No CMT, cervical os closed. No uterine tenderness. Mild left adnexal tenderness, no mass. No adnexal tenderness or mass palpated. SKIN: Normal for age and race; warm; dry; good turgor; no apparent lesions or exudate. NEURO: A & O x3. Cranial nerves 2-12 intact. Motor 5/5 bilaterally. Sensation intact. PSYCH:  Mood and affect appropriate.          Diagnostic Study Results     Labs -     Recent Results (from the past 12 hour(s))   URINALYSIS W/ RFLX MICROSCOPIC    Collection Time: 01/27/21  1:55 PM   Result Value Ref Range    Color YELLOW      Appearance CLEAR      Specific gravity 1.008 1.005 - 1.030      pH (UA) 7.0 5.0 - 8.0      Protein Negative NEG mg/dL    Glucose Negative NEG mg/dL    Ketone 40 (A) NEG mg/dL    Bilirubin Negative NEG      Blood Negative NEG      Urobilinogen 0.2 0.2 - 1.0 EU/dL    Nitrites Negative NEG      Leukocyte Esterase Negative NEG     HCG URINE, QL    Collection Time: 01/27/21  1:55 PM   Result Value Ref Range    HCG urine, QL Positive (A) NEG     WET PREP    Collection Time: 01/27/21  2:05 PM    Specimen: Vaginal Specimen   Result Value Ref Range    Special Requests: NO SPECIAL REQUESTS      Wet prep NO YEAST,TRICHOMONAS OR CLUE CELLS NOTED     CBC WITH AUTOMATED DIFF    Collection Time: 01/27/21  2:35 PM   Result Value Ref Range    WBC 7.4 4.6 - 13.2 K/uL    RBC 4.92 4.20 - 5.30 M/uL    HGB 15.2 12.0 - 16.0 g/dL    HCT 44.2 35.0 - 45.0 %    MCV 89.8 74.0 - 97.0 FL    MCH 30.9 24.0 - 34.0 PG    MCHC 34.4 31.0 - 37.0 g/dL    RDW 11.8 11.6 - 14.5 %    PLATELET 676 752 - 420 K/uL    MPV 10.3 9.2 - 11.8 FL    NEUTROPHILS 76 (H) 40 - 73 %    LYMPHOCYTES 19 (L) 21 - 52 %    MONOCYTES 5 3 - 10 %    EOSINOPHILS 0 0 - 5 %    BASOPHILS 0 0 - 2 %    ABS. NEUTROPHILS 5.5 1.8 - 8.0 K/UL    ABS. LYMPHOCYTES 1.4 0.9 - 3.6 K/UL    ABS. MONOCYTES 0.4 0.05 - 1.2 K/UL    ABS. EOSINOPHILS 0.0 0.0 - 0.4 K/UL    ABS. BASOPHILS 0.0 0.0 - 0.1 K/UL    DF AUTOMATED     METABOLIC PANEL, BASIC    Collection Time: 01/27/21  2:35 PM   Result Value Ref Range    Sodium 136 136 - 145 mmol/L    Potassium 3.6 3.5 - 5.5 mmol/L    Chloride 105 100 - 111 mmol/L    CO2 27 21 - 32 mmol/L    Anion gap 4 3.0 - 18 mmol/L    Glucose 83 74 - 99 mg/dL    BUN 5 (L) 7.0 - 18 MG/DL    Creatinine 0.63 0.6 - 1.3 MG/DL    BUN/Creatinine ratio 8 (L) 12 - 20      GFR est AA >60 >60 ml/min/1.73m2    GFR est non-AA >60 >60 ml/min/1.73m2    Calcium 9.9 8.5 - 10.1 MG/DL   BETA HCG, QT    Collection Time: 01/27/21  2:35 PM   Result Value Ref Range    Beta HCG, QT 4,187 (H) 0 - 10 MIU/ML   BLOOD TYPE, (ABO+RH)    Collection Time: 01/27/21  3:05 PM   Result Value Ref Range    ABO/Rh(D) B POSITIVE        Radiologic Studies -   US OB TV W DOPPLER   Final Result      1. Single intrauterine gestational sac without evidence of yolk sac or fetal   pole. Findings may reflect changes of early gestation. Recommend dedicated   clinical follow-up, trending of beta hCG levels, and repeat ultrasound   assessment to document viable intrauterine pregnancy. 2. Normal blood flow to each ovary. Likely right ovarian corpus luteum. CT Results  (Last 48 hours)    None        CXR Results  (Last 48 hours)    None          Medications given in the ED-  Medications - No data to display      Medical Decision Making   I am the first provider for this patient.     I reviewed the vital signs, available nursing notes, past medical history, past surgical history, family history and social history. Vital Signs-Reviewed the patient's vital signs. Records Reviewed: Nursing Notes      Procedures:  Procedures    ED Course:  2:01 PM   Initial assessment performed. The patients presenting problems have been discussed, and they are in agreement with the care plan formulated and outlined with them. I have encouraged them to ask questions as they arise throughout their visit. 4 PM   Care will be transferred from Rishabh Gil PA-C to Jameel Cartagena PA-C to await OB ultrasound and final disposition. 5:10 PM  Reviewed ultrasound findings with patient. Threatened miscarriage ductions given. Patient to return to this facility 2 days for repeat quant. Also recommended OB/GYN follow-up for her she does not have one in mind as of yet. Provider Notes (Medical Decision Making): Graciela Cook is a 19 y.o. female with complaint of pelvic cramping and vaginal spotting for 24 hours with home UPT. Primigravida with stable vitals benign physical exam normal labs. Quant above 4000 with an ultrasound with sac no heartbeat yet. Likely threatened miscarriage perhaps early pregnancy. Return to this facility in 2 days for repeat quant. GC chlamydia cultures pending. Diagnosis and Disposition       DISCHARGE NOTE:    Terrence Wilkinson  results have been reviewed with her. She has been counseled regarding her diagnosis, treatment, and plan. She verbally conveys understanding and agreement of the signs, symptoms, diagnosis, treatment and prognosis and additionally agrees to follow up as discussed. She also agrees with the care-plan and conveys that all of her questions have been answered. I have also provided discharge instructions for her that include: educational information regarding their diagnosis and treatment, and list of reasons why they would want to return to the ED prior to their follow-up appointment, should her condition change.  She has been provided with education for proper emergency department utilization. CLINICAL IMPRESSION:    1. Threatened miscarriage in early pregnancy    2. Nausea and vomiting during pregnancy prior to 22 weeks gestation        PLAN:  1. D/C Home  2. Current Discharge Medication List      START taking these medications    Details   ondansetron (Zofran ODT) 4 mg disintegrating tablet 1 Tab by SubLINGual route every eight (8) hours as needed for Nausea or Vomiting. Qty: 20 Tab, Refills: 0           3. Follow-up Information     Follow up With Specialties Details Why Ck Erwin MD Obstetrics & Gynecology, Gynecology, Obstetrics Schedule an appointment as soon as possible for a visit   [address] [place] [address] Drive [address] [phone]      [facility] EMERGENCY DEPT Emergency Medicine Go in 2 days for repeat HCG [address] [id number]  [phone]        _______________________________      Please note that this dictation was completed with [employer], the computer voice recognition software. Quite often unanticipated grammatical, syntax, homophones, and other interpretive errors are inadvertently transcribed by the computer software. Please disregard these errors. Please excuse any errors that have escaped final proofreading.

## 2021-01-27 NOTE — ED TRIAGE NOTES
Pt states \" I missed my period and then took 2 tests an they were positive. I went to work last night and I started cramping then last night I noticed blood I got up this morning and went to work and I am cramping and spotting brown blood. \"

## 2021-01-29 ENCOUNTER — HOSPITAL ENCOUNTER (EMERGENCY)
Age: 21
Discharge: HOME | End: 2021-01-29
Attending: EMERGENCY MEDICINE
Payer: MEDICAID

## 2021-01-29 VITALS
TEMPERATURE: 98.4 F | HEART RATE: 84 BPM | DIASTOLIC BLOOD PRESSURE: 80 MMHG | RESPIRATION RATE: 17 BRPM | OXYGEN SATURATION: 100 % | SYSTOLIC BLOOD PRESSURE: 121 MMHG

## 2021-01-29 DIAGNOSIS — O20.0 THREATENED MISCARRIAGE: Primary | ICD-10-CM

## 2021-01-29 LAB
BASOPHILS # BLD: 0 K/UL (ref 0–0.1)
BASOPHILS NFR BLD: 0 % (ref 0–2)
C TRACH RRNA SPEC QL NAA+PROBE: NEGATIVE
DIFFERENTIAL METHOD BLD: ABNORMAL
EOSINOPHIL # BLD: 0 K/UL (ref 0–0.4)
EOSINOPHIL NFR BLD: 0 % (ref 0–5)
ERYTHROCYTE [DISTWIDTH] IN BLOOD BY AUTOMATED COUNT: 11.7 % (ref 11.6–14.5)
HCG SERPL-ACNC: 7384 MIU/ML (ref 0–10)
HCT VFR BLD AUTO: 43.6 % (ref 35–45)
HGB BLD-MCNC: 14.9 G/DL (ref 12–16)
LYMPHOCYTES # BLD: 1.7 K/UL (ref 0.9–3.6)
LYMPHOCYTES NFR BLD: 18 % (ref 21–52)
MCH RBC QN AUTO: 30.8 PG (ref 24–34)
MCHC RBC AUTO-ENTMCNC: 34.2 G/DL (ref 31–37)
MCV RBC AUTO: 90.1 FL (ref 74–97)
MONOCYTES # BLD: 0.8 K/UL (ref 0.05–1.2)
MONOCYTES NFR BLD: 9 % (ref 3–10)
N GONORRHOEA RRNA SPEC QL NAA+PROBE: NEGATIVE
NEUTS SEG # BLD: 6.9 K/UL (ref 1.8–8)
NEUTS SEG NFR BLD: 73 % (ref 40–73)
PLATELET # BLD AUTO: 258 K/UL (ref 135–420)
PMV BLD AUTO: 10.4 FL (ref 9.2–11.8)
RBC # BLD AUTO: 4.84 M/UL (ref 4.2–5.3)
SPECIMEN SOURCE: NORMAL
WBC # BLD AUTO: 9.4 K/UL (ref 4.6–13.2)

## 2021-01-29 PROCEDURE — 84702 CHORIONIC GONADOTROPIN TEST: CPT

## 2021-01-29 PROCEDURE — 85025 COMPLETE CBC W/AUTO DIFF WBC: CPT

## 2021-01-29 PROCEDURE — 99284 EMERGENCY DEPT VISIT MOD MDM: CPT

## 2021-01-29 PROCEDURE — 99283 EMERGENCY DEPT VISIT LOW MDM: CPT

## 2021-01-29 NOTE — ED TRIAGE NOTES
Patient seen here and clinically verified of pregnancy  Returned prior to redraw of HCG serum levels d/t onset of vaginal bleeding and back pain    Patient reports 5 weeks pregnant

## 2021-01-29 NOTE — ED PROVIDER NOTES
EMERGENCY DEPARTMENT HISTORY AND PHYSICAL EXAM    Date: 2021  Patient Name: Nick Brooks    History of Presenting Illness     Chief Complaint   Patient presents with    Vaginal Bleeding    Pregnancy Problem         History Provided By: Patient    Chief Complaint: vaginal bleeding in preg       Additional History (Context):   4:29 PM  Nick Brooks is a 19 y.o. female  presents to the emergency department C/O bleeding and pregnancy. Patient is about 5 weeks pregnant was seen here 2 days ago for lower back pain and light vaginal bleeding. Her blood type is B+. Her beta was 4187 that day. Advised to come back in 2 days for repeat beta. She returns today as the bleeding had become somewhat heavier. ReSound showed single intrauterine gestational sac without fetal pole her last visit. PCP: Anupama, MD Tanja    Current Outpatient Medications   Medication Sig Dispense Refill    ondansetron (Zofran ODT) 4 mg disintegrating tablet 1 Tab by SubLINGual route every eight (8) hours as needed for Nausea or Vomiting. 20 Tab 0       Past History     Past Medical History:  Past Medical History:   Diagnosis Date    Ovarian cyst        Past Surgical History:  History reviewed. No pertinent surgical history. Family History:  History reviewed. No pertinent family history. Social History:  Social History     Tobacco Use    Smoking status: Current Every Day Smoker     Packs/day: 0.50    Smokeless tobacco: Never Used   Substance Use Topics    Alcohol use: No    Drug use: Never       Allergies: Allergies   Allergen Reactions    Amoxicillin Hives and Swelling       Review of Systems   Review of Systems   Constitutional: Negative for chills and fever. Respiratory: Negative for shortness of breath. Cardiovascular: Negative for chest pain. Gastrointestinal: Negative for abdominal pain, diarrhea, nausea and vomiting. Genitourinary: Positive for vaginal bleeding.    Musculoskeletal: Positive for back pain.   Neurological: Negative for weakness and numbness. All other systems reviewed and are negative. Physical Exam     Vitals:    01/29/21 1611   BP: 121/80   Pulse: 84   Resp: 17   Temp: 98.4 °F (36.9 °C)   SpO2: 100%     Physical Exam  Vitals signs and nursing note reviewed. Constitutional:       Appearance: She is well-developed. HENT:      Head: Normocephalic and atraumatic. Neck:      Musculoskeletal: Normal range of motion and neck supple. Cardiovascular:      Rate and Rhythm: Normal rate and regular rhythm. Heart sounds: Normal heart sounds. No murmur. Pulmonary:      Effort: Pulmonary effort is normal. No respiratory distress. Breath sounds: Normal breath sounds. No wheezing or rales. Abdominal:      General: Bowel sounds are normal.      Palpations: Abdomen is soft. Tenderness: There is no abdominal tenderness. Genitourinary:     Comments: See pelvic exam procedure note  Neurological:      Mental Status: She is alert and oriented to person, place, and time. Psychiatric:         Judgment: Judgment normal.           Diagnostic Study Results     Labs:     Recent Results (from the past 12 hour(s))   BETA HCG, QT    Collection Time: 01/29/21  5:00 PM   Result Value Ref Range    Beta HCG, QT 7,384 (H) 0 - 10 MIU/ML   CBC WITH AUTOMATED DIFF    Collection Time: 01/29/21  5:00 PM   Result Value Ref Range    WBC 9.4 4.6 - 13.2 K/uL    RBC 4.84 4.20 - 5.30 M/uL    HGB 14.9 12.0 - 16.0 g/dL    HCT 43.6 35.0 - 45.0 %    MCV 90.1 74.0 - 97.0 FL    MCH 30.8 24.0 - 34.0 PG    MCHC 34.2 31.0 - 37.0 g/dL    RDW 11.7 11.6 - 14.5 %    PLATELET 900 848 - 002 K/uL    MPV 10.4 9.2 - 11.8 FL    NEUTROPHILS 73 40 - 73 %    LYMPHOCYTES 18 (L) 21 - 52 %    MONOCYTES 9 3 - 10 %    EOSINOPHILS 0 0 - 5 %    BASOPHILS 0 0 - 2 %    ABS. NEUTROPHILS 6.9 1.8 - 8.0 K/UL    ABS. LYMPHOCYTES 1.7 0.9 - 3.6 K/UL    ABS. MONOCYTES 0.8 0.05 - 1.2 K/UL    ABS. EOSINOPHILS 0.0 0.0 - 0.4 K/UL    ABS.  BASOPHILS 0.0 0.0 - 0.1 K/UL    DF AUTOMATED         Radiologic Studies:   No orders to display     CT Results  (Last 48 hours)    None        CXR Results  (Last 48 hours)    None          Medical Decision Making   I am the first provider for this patient. I reviewed the vital signs, available nursing notes, past medical history, past surgical history, family history and social history. Vital Signs: Reviewed the patient's vital signs. Pulse Oximetry Analysis: 100% on RA       Records Reviewed: Nursing Notes and Old Medical Records    Procedures:  Pelvic Exam    Date/Time: 1/29/2021 5:49 PM  Performed by: PA  Procedure duration:  5 minutes. Exam assisted by: Lucille Gandhi. Type of exam performed: bimanual and speculum. External genitalia appearance: normal.    Vaginal exam:  normal.    Cervical exam:  normal and os closed. Specimen(s) collected:  none. Bimanual exam:  uterine tenderness. Patient tolerance: patient tolerated the procedure well with no immediate complications          ED Course:   4:29 PM Initial assessment performed. The patients presenting problems have been discussed, and they are in agreement with the care plan formulated and outlined with them. I have encouraged them to ask questions as they arise throughout their visit. Discussion:  Pt presents with vaginal bleeding lower abdominal pain and back pain. Patient was seen here couple days ago and was found to be pregnant had an intrauterine gestational sac without fetal pole. Today on pelvic exam cervical os appears closed and there is no bleeding seen. CBC is normal and beta is appropriately elevated. Her blood type is B+ no need for RhoGam.  Will have patient follow-up with OB/GYN. Strict return precautions given, pt offering no questions or complaints. Diagnosis and Disposition     DISCHARGE NOTE:  Tracy Mitchell  results have been reviewed with her. She has been counseled regarding her diagnosis, treatment, and plan. She verbally conveys understanding and agreement of the signs, symptoms, diagnosis, treatment and prognosis and additionally agrees to follow up as discussed. She also agrees with the care-plan and conveys that all of her questions have been answered. I have also provided discharge instructions for her that include: educational information regarding their diagnosis and treatment, and list of reasons why they would want to return to the ED prior to their follow-up appointment, should her condition change. She has been provided with education for proper emergency department utilization. CLINICAL IMPRESSION:    1. Threatened miscarriage        PLAN:  1. D/C Home  2. Discharge Medication List as of 1/29/2021  6:46 PM        3. Follow-up Information     Follow up With Specialties Details Why Contact Info    Armand Alcala MD Obstetrics & Gynecology, Gynecology, Obstetrics   [address]  [phone]                   Please note that this dictation was completed with [employer], the computer voice recognition software. Quite often unanticipated grammatical, syntax, homophones, and other interpretive errors are inadvertently transcribed by the computer software. Please disregard these errors. Please excuse any errors that have escaped final proofreading.

## 2021-02-22 ENCOUNTER — HOSPITAL ENCOUNTER (EMERGENCY)
Dept: ULTRASOUND IMAGING | Age: 21
Discharge: HOME | End: 2021-02-22
Attending: PHYSICIAN ASSISTANT
Payer: MEDICAID

## 2021-02-22 ENCOUNTER — HOSPITAL ENCOUNTER (EMERGENCY)
Age: 21
Discharge: HOME | End: 2021-02-23
Attending: EMERGENCY MEDICINE
Payer: MEDICAID

## 2021-02-22 DIAGNOSIS — O20.9 VAGINAL BLEEDING IN PREGNANCY, FIRST TRIMESTER: ICD-10-CM

## 2021-02-22 DIAGNOSIS — B96.89 BV (BACTERIAL VAGINOSIS): ICD-10-CM

## 2021-02-22 DIAGNOSIS — Z3A.09 9 WEEKS GESTATION OF PREGNANCY: Primary | ICD-10-CM

## 2021-02-22 DIAGNOSIS — N76.0 BV (BACTERIAL VAGINOSIS): ICD-10-CM

## 2021-02-22 LAB
ALBUMIN SERPL-MCNC: 3.4 G/DL (ref 3.4–5)
ALBUMIN/GLOB SERPL: 1.2 {RATIO} (ref 0.8–1.7)
ALP SERPL-CCNC: 50 U/L (ref 45–117)
ALT SERPL-CCNC: 14 U/L (ref 13–56)
ANION GAP SERPL CALC-SCNC: 7 MMOL/L (ref 3–18)
APPEARANCE UR: CLEAR
AST SERPL-CCNC: 8 U/L (ref 10–38)
BASOPHILS # BLD: 0 K/UL (ref 0–0.1)
BASOPHILS NFR BLD: 0 % (ref 0–2)
BILIRUB SERPL-MCNC: 0.2 MG/DL (ref 0.2–1)
BILIRUB UR QL: NEGATIVE
BUN SERPL-MCNC: 9 MG/DL (ref 7–18)
BUN/CREAT SERPL: 18 (ref 12–20)
CALCIUM SERPL-MCNC: 8.7 MG/DL (ref 8.5–10.1)
CHLORIDE SERPL-SCNC: 107 MMOL/L (ref 100–111)
CO2 SERPL-SCNC: 25 MMOL/L (ref 21–32)
COLOR UR: YELLOW
CREAT SERPL-MCNC: 0.5 MG/DL (ref 0.6–1.3)
DIFFERENTIAL METHOD BLD: ABNORMAL
EOSINOPHIL # BLD: 0.1 K/UL (ref 0–0.4)
EOSINOPHIL NFR BLD: 1 % (ref 0–5)
ERYTHROCYTE [DISTWIDTH] IN BLOOD BY AUTOMATED COUNT: 11.9 % (ref 11.6–14.5)
GLOBULIN SER CALC-MCNC: 2.8 G/DL (ref 2–4)
GLUCOSE SERPL-MCNC: 68 MG/DL (ref 74–99)
GLUCOSE UR STRIP.AUTO-MCNC: NEGATIVE MG/DL
HCG SERPL-ACNC: ABNORMAL MIU/ML (ref 0–10)
HCT VFR BLD AUTO: 35.3 % (ref 35–45)
HGB BLD-MCNC: 12.6 G/DL (ref 12–16)
HGB UR QL STRIP: NEGATIVE
KETONES UR QL STRIP.AUTO: ABNORMAL MG/DL
LEUKOCYTE ESTERASE UR QL STRIP.AUTO: NEGATIVE
LYMPHOCYTES # BLD: 2.4 K/UL (ref 0.9–3.6)
LYMPHOCYTES NFR BLD: 32 % (ref 21–52)
MCH RBC QN AUTO: 32.5 PG (ref 24–34)
MCHC RBC AUTO-ENTMCNC: 35.7 G/DL (ref 31–37)
MCV RBC AUTO: 91 FL (ref 74–97)
MONOCYTES # BLD: 0.6 K/UL (ref 0.05–1.2)
MONOCYTES NFR BLD: 8 % (ref 3–10)
NEUTS SEG # BLD: 4.5 K/UL (ref 1.8–8)
NEUTS SEG NFR BLD: 59 % (ref 40–73)
NITRITE UR QL STRIP.AUTO: NEGATIVE
PH UR STRIP: 6 [PH] (ref 5–8)
PLATELET # BLD AUTO: 202 K/UL (ref 135–420)
PMV BLD AUTO: 9.8 FL (ref 9.2–11.8)
POTASSIUM SERPL-SCNC: 3.7 MMOL/L (ref 3.5–5.5)
PROT SERPL-MCNC: 6.2 G/DL (ref 6.4–8.2)
PROT UR STRIP-MCNC: NEGATIVE MG/DL
RBC # BLD AUTO: 3.88 M/UL (ref 4.2–5.3)
SERVICE CMNT-IMP: NORMAL
SODIUM SERPL-SCNC: 139 MMOL/L (ref 136–145)
SP GR UR REFRACTOMETRY: 1.02 (ref 1–1.03)
UROBILINOGEN UR QL STRIP.AUTO: 1 EU/DL (ref 0.2–1)
WBC # BLD AUTO: 7.6 K/UL (ref 4.6–13.2)
WET PREP GENITAL: NORMAL
WET PREP GENITAL: NORMAL

## 2021-02-22 PROCEDURE — 93975 VASCULAR STUDY: CPT

## 2021-02-22 PROCEDURE — 84702 CHORIONIC GONADOTROPIN TEST: CPT

## 2021-02-22 PROCEDURE — 87210 SMEAR WET MOUNT SALINE/INK: CPT

## 2021-02-22 PROCEDURE — 81003 URINALYSIS AUTO W/O SCOPE: CPT

## 2021-02-22 PROCEDURE — 85025 COMPLETE CBC W/AUTO DIFF WBC: CPT

## 2021-02-22 PROCEDURE — 80053 COMPREHEN METABOLIC PANEL: CPT

## 2021-02-22 PROCEDURE — 87591 N.GONORRHOEAE DNA AMP PROB: CPT

## 2021-02-22 PROCEDURE — 99284 EMERGENCY DEPT VISIT MOD MDM: CPT

## 2021-02-22 RX ORDER — SODIUM CHLORIDE 0.9 % (FLUSH) 0.9 %
5-40 SYRINGE (ML) INJECTION AS NEEDED
Status: DISCONTINUED | OUTPATIENT
Start: 2021-02-22 | End: 2021-02-23 | Stop reason: HOSPADM

## 2021-02-22 RX ORDER — SODIUM CHLORIDE 0.9 % (FLUSH) 0.9 %
5-40 SYRINGE (ML) INJECTION EVERY 8 HOURS
Status: DISCONTINUED | OUTPATIENT
Start: 2021-02-22 | End: 2021-02-23 | Stop reason: HOSPADM

## 2021-02-23 VITALS
WEIGHT: 144 LBS | SYSTOLIC BLOOD PRESSURE: 100 MMHG | HEIGHT: 67 IN | OXYGEN SATURATION: 98 % | DIASTOLIC BLOOD PRESSURE: 54 MMHG | HEART RATE: 82 BPM | RESPIRATION RATE: 16 BRPM | BODY MASS INDEX: 22.6 KG/M2 | TEMPERATURE: 98.2 F

## 2021-02-23 RX ORDER — METRONIDAZOLE 500 MG/1
500 TABLET ORAL 2 TIMES DAILY
Qty: 14 TAB | Refills: 0 | Status: SHIPPED | OUTPATIENT
Start: 2021-02-23 | End: 2021-03-02

## 2021-02-23 NOTE — ED NOTES
Discharged patient for primary nurse. I have reviewed discharge instructions with the patient. The patient verbalized understanding. Discharge medications reviewed with patient and appropriate educational materials and side effects teaching were provided. NAD. VSS. AOx4. Ambulatory with steady gait.

## 2021-02-23 NOTE — ED PROVIDER NOTES
EMERGENCY DEPARTMENT HISTORY AND PHYSICAL EXAM    Date: 2/22/2021  Patient Name: Harshil Unger    History of Presenting Illness     Chief Complaint   Patient presents with    Vaginal Bleeding    Abdominal Pain         History Provided By: Patient    Harshil Unger is a 19 y.o. female primigravida with no PMHx who presents to the emergency department C/O vaginal bleeding. Associated sxs include pelvic pain. She reports last menstrual cycle mid December she is approximately 9 weeks pregnant by dates with no OB care as of yet. She has upcoming appointments at the end of this week at [address] [place] [address]. Patient states that this morning started with some pelvic cramping type pain and vaginal bleeding that she is had all day long. Patient states the vaginal bleeding is not quite as heavy as normal menstrual flow but is more than just simple spotting. Patient states she is otherwise healthy has had some intermittent nausea and vomiting but currently is not nauseated. Pt denies dysuria, fever, new sex contacts or concern for STDs, and any other sxs or complaints. PCP: Other, MD Tanja    Current Facility-Administered Medications   Medication Dose Route Frequency Provider Last Rate Last Admin    sodium chloride (NS) flush 5-40 mL  5-40 mL IntraVENous Q8H Krista Rosa C, DO        sodium chloride (NS) flush 5-40 mL  5-40 mL IntraVENous PRN Rosa Velasco, DO         Current Outpatient Medications   Medication Sig Dispense Refill    metroNIDAZOLE (FlagyL) 500 mg tablet Take 1 Tab by mouth two (2) times a day for 7 days. 14 Tab 0    ondansetron (Zofran ODT) 4 mg disintegrating tablet 1 Tab by SubLINGual route every eight (8) hours as needed for Nausea or Vomiting. 20 Tab 0       Past History     Past Medical History:  Past Medical History:   Diagnosis Date    Ovarian cyst        Past Surgical History:  History reviewed. No pertinent surgical history. Family History:  History reviewed.  No pertinent family history. Social History:  Social History     Tobacco Use    Smoking status: Current Every Day Smoker     Packs/day: 0.50    Smokeless tobacco: Never Used   Substance Use Topics    Alcohol use: No    Drug use: Never       Allergies: Allergies   Allergen Reactions    Amoxicillin Hives and Swelling         Review of Systems   Review of Systems   Constitutional: Negative for fever. Gastrointestinal: Positive for nausea and vomiting. Genitourinary: Positive for pelvic pain and vaginal bleeding. Negative for dysuria. Musculoskeletal: Negative for myalgias. Skin: Negative for rash. All other systems reviewed and are negative. Physical Exam     Vitals:    02/22/21 2155   BP: (!) 100/54   Pulse: 84   Resp: 16   Temp: 98.2 °F (36.8 °C)   SpO2: 100%   Weight: 65.3 kg (144 lb)   Height: 5' 7\" (1.702 m)     Physical Exam  Vitals signs and nursing note reviewed. Exam conducted with a chaperone present. Constitutional:       General: She is not in acute distress. Appearance: She is well-developed and normal weight. She is not ill-appearing. HENT:      Head: Normocephalic and atraumatic. Cardiovascular:      Rate and Rhythm: Normal rate. Pulmonary:      Effort: Pulmonary effort is normal.   Abdominal:      General: Abdomen is flat. Bowel sounds are normal.      Palpations: Abdomen is soft. Tenderness: There is no abdominal tenderness. Genitourinary:     Vagina: Vaginal discharge (pink mucus) present. Cervix: No cervical motion tenderness or cervical bleeding. Uterus: Not tender. Skin:     General: Skin is warm and dry. Capillary Refill: Capillary refill takes less than 2 seconds. Neurological:      General: No focal deficit present. Mental Status: She is alert and oriented to person, place, and time.    Psychiatric:         Mood and Affect: Mood normal.         Behavior: Behavior normal.           Diagnostic Study Results     Labs -     Recent Results (from the past 12 hour(s))   URINALYSIS W/ RFLX MICROSCOPIC    Collection Time: 02/22/21 10:18 PM   Result Value Ref Range    Color YELLOW      Appearance CLEAR      Specific gravity 1.023 1.005 - 1.030      pH (UA) 6.0 5.0 - 8.0      Protein Negative NEG mg/dL    Glucose Negative NEG mg/dL    Ketone TRACE (A) NEG mg/dL    Bilirubin Negative NEG      Blood Negative NEG      Urobilinogen 1.0 0.2 - 1.0 EU/dL    Nitrites Negative NEG      Leukocyte Esterase Negative NEG     WET PREP    Collection Time: 02/22/21 10:18 PM    Specimen: Vagina   Result Value Ref Range    Special Requests: NO SPECIAL REQUESTS      Wet prep CLUE CELLS PRESENT  MODERATE        Wet prep NO YEAST SEEN  NO TRICHOMONAS SEEN       CBC WITH AUTOMATED DIFF    Collection Time: 02/22/21 10:25 PM   Result Value Ref Range    WBC 7.6 4.6 - 13.2 K/uL    RBC 3.88 (L) 4.20 - 5.30 M/uL    HGB 12.6 12.0 - 16.0 g/dL    HCT 35.3 35.0 - 45.0 %    MCV 91.0 74.0 - 97.0 FL    MCH 32.5 24.0 - 34.0 PG    MCHC 35.7 31.0 - 37.0 g/dL    RDW 11.9 11.6 - 14.5 %    PLATELET 944 027 - 739 K/uL    MPV 9.8 9.2 - 11.8 FL    NEUTROPHILS 59 40 - 73 %    LYMPHOCYTES 32 21 - 52 %    MONOCYTES 8 3 - 10 %    EOSINOPHILS 1 0 - 5 %    BASOPHILS 0 0 - 2 %    ABS. NEUTROPHILS 4.5 1.8 - 8.0 K/UL    ABS. LYMPHOCYTES 2.4 0.9 - 3.6 K/UL    ABS. MONOCYTES 0.6 0.05 - 1.2 K/UL    ABS. EOSINOPHILS 0.1 0.0 - 0.4 K/UL    ABS.  BASOPHILS 0.0 0.0 - 0.1 K/UL    DF AUTOMATED     BETA HCG, QT    Collection Time: 02/22/21 10:25 PM   Result Value Ref Range    Beta HCG, QT 82,803 (H) 0 - 10 MIU/ML   METABOLIC PANEL, COMPREHENSIVE    Collection Time: 02/22/21 10:25 PM   Result Value Ref Range    Sodium 139 136 - 145 mmol/L    Potassium 3.7 3.5 - 5.5 mmol/L    Chloride 107 100 - 111 mmol/L    CO2 25 21 - 32 mmol/L    Anion gap 7 3.0 - 18 mmol/L    Glucose 68 (L) 74 - 99 mg/dL    BUN 9 7.0 - 18 MG/DL    Creatinine 0.50 (L) 0.6 - 1.3 MG/DL    BUN/Creatinine ratio 18 12 - 20      GFR est AA >60 >60 ml/min/1.73m2    GFR est non-AA >60 >60 ml/min/1.73m2    Calcium 8.7 8.5 - 10.1 MG/DL    Bilirubin, total 0.2 0.2 - 1.0 MG/DL    ALT (SGPT) 14 13 - 56 U/L    AST (SGOT) 8 (L) 10 - 38 U/L    Alk. phosphatase 50 45 - 117 U/L    Protein, total 6.2 (L) 6.4 - 8.2 g/dL    Albumin 3.4 3.4 - 5.0 g/dL    Globulin 2.8 2.0 - 4.0 g/dL    A-G Ratio 1.2 0.8 - 1.7         Radiologic Studies -   US OB TV W DOPPLER   Final Result      Viable single intrauterine pregnancy. _______________                          CT Results  (Last 48 hours)    None        CXR Results  (Last 48 hours)    None          Medications given in the ED-  Medications   sodium chloride (NS) flush 5-40 mL (has no administration in time range)   sodium chloride (NS) flush 5-40 mL (has no administration in time range)         Medical Decision Making   I am the first provider for this patient. I reviewed the vital signs, available nursing notes, past medical history, past surgical history, family history and social history. Vital Signs-Reviewed the patient's vital signs. Pulse Oximetry Analysis - 100% on RA    Records Reviewed: Nursing Notes    Procedures:  Pelvic Exam    Date/Time: 2/22/2021 10:20 PM  Performed by: PA  Type of exam performed: bimanual and speculum. External genitalia appearance: normal.    Vaginal exam:  bleeding and discharge. Bleeding: mild  The amount of discharge was:  mild. The discharge was mucousy (pink). Cervical exam:  no cervical motion tenderness. Specimen(s) collected:  chlamydia and GC. Bimanual exam:  normal.    Patient tolerance: patient tolerated the procedure well with no immediate complications          ED Course:   10:18 PM   Initial assessment performed. The patients presenting problems have been discussed, and they are in agreement with the care plan formulated and outlined with them. I have encouraged them to ask questions as they arise throughout their visit.     Discussion: 19 y.o. female primigravida approximately 5 weeks pregnant with lower abdominal cramping and vaginal bleeding today first OB appointment this upcoming week. She is afebrile she is appropriate vital signs she is a benign physical exam mild pink discharge in vault with GC chlamydia cultures pending. Quant nice and high, stable CBC and ultrasound reveals IUP 9 weeks 4 days. B+. OB f/u  Return precautions discussed. Diagnosis and Disposition       DISCHARGE NOTE:  Annettease Snare  results have been reviewed with her. She has been counseled regarding her diagnosis, treatment, and plan. She verbally conveys understanding and agreement of the signs, symptoms, diagnosis, treatment and prognosis and additionally agrees to follow up as discussed. She also agrees with the care-plan and conveys that all of her questions have been answered. I have also provided discharge instructions for her that include: educational information regarding their diagnosis and treatment, and list of reasons why they would want to return to the ED prior to their follow-up appointment, should her condition change. She has been provided with education for proper emergency department utilization. CLINICAL IMPRESSION:    1. 9 weeks gestation of pregnancy    2. Vaginal bleeding in pregnancy, first trimester    3. BV (bacterial vaginosis)        PLAN:  1. D/C Home  2. Current Discharge Medication List      START taking these medications    Details   metroNIDAZOLE (FlagyL) 500 mg tablet Take 1 Tab by mouth two (2) times a day for 7 days. Qty: 14 Tab, Refills: 0           3.    Follow-up Information     Follow up With Specialties Details Why Contact [facility]  Go to   [address] [facility] [address]    [facility] EM[facility] Emergency Medicine  As needed, If symptoms worsen [address]  [address] [facility] [address] [phone]                  Please note that this dictation was completed with [employer], the [employer] voice recognition software. Quite often unanticipated grammatical, syntax, homophones, and other interpretive errors are inadvertently transcribed by the computer software. Please disregard these errors. Please excuse any errors that have escaped final proofreading.

## 2021-02-23 NOTE — ED TRIAGE NOTES
Patient c/o vaginal bleeding and abdominal cramping since today. Patient reports she is 9 weeks pregnant.

## 2021-02-23 NOTE — ED NOTES
Verbal shift change report given to [address] [place] [address] (oncoming nurse) by Ramona Head RN (offgoing nurse). Report included the following information SBAR, ED Summary, Procedure Summary and MAR.

## 2021-02-26 LAB
C TRACH RRNA SPEC QL NAA+PROBE: NEGATIVE
N GONORRHOEA RRNA SPEC QL NAA+PROBE: NEGATIVE
SPECIMEN SOURCE: NORMAL

## 2021-04-10 ENCOUNTER — HOSPITAL ENCOUNTER (EMERGENCY)
Age: 21
Discharge: HOME | End: 2021-04-11
Attending: STUDENT IN AN ORGANIZED HEALTH CARE EDUCATION/TRAINING PROGRAM
Payer: COMMERCIAL

## 2021-04-10 DIAGNOSIS — R51.9 NONINTRACTABLE HEADACHE, UNSPECIFIED CHRONICITY PATTERN, UNSPECIFIED HEADACHE TYPE: Primary | ICD-10-CM

## 2021-04-10 DIAGNOSIS — E87.6 HYPOKALEMIA: ICD-10-CM

## 2021-04-10 DIAGNOSIS — Z3A.16 16 WEEKS GESTATION OF PREGNANCY: ICD-10-CM

## 2021-04-10 LAB
ANION GAP SERPL CALC-SCNC: 7 MMOL/L (ref 3–18)
APPEARANCE UR: CLEAR
BASOPHILS # BLD: 0 K/UL (ref 0–0.1)
BASOPHILS NFR BLD: 0 % (ref 0–2)
BILIRUB UR QL: NEGATIVE
BUN SERPL-MCNC: 8 MG/DL (ref 7–18)
BUN/CREAT SERPL: 16 (ref 12–20)
CALCIUM SERPL-MCNC: 8.2 MG/DL (ref 8.5–10.1)
CHLORIDE SERPL-SCNC: 108 MMOL/L (ref 100–111)
CO2 SERPL-SCNC: 22 MMOL/L (ref 21–32)
COLOR UR: YELLOW
CREAT SERPL-MCNC: 0.5 MG/DL (ref 0.6–1.3)
DIFFERENTIAL METHOD BLD: ABNORMAL
EOSINOPHIL # BLD: 0.1 K/UL (ref 0–0.4)
EOSINOPHIL NFR BLD: 1 % (ref 0–5)
ERYTHROCYTE [DISTWIDTH] IN BLOOD BY AUTOMATED COUNT: 11.1 % (ref 11.6–14.5)
GLUCOSE SERPL-MCNC: 91 MG/DL (ref 74–99)
GLUCOSE UR STRIP.AUTO-MCNC: NEGATIVE MG/DL
HCT VFR BLD AUTO: 36.8 % (ref 35–45)
HGB BLD-MCNC: 12.6 G/DL (ref 12–16)
HGB UR QL STRIP: NEGATIVE
KETONES UR QL STRIP.AUTO: ABNORMAL MG/DL
LEUKOCYTE ESTERASE UR QL STRIP.AUTO: NEGATIVE
LYMPHOCYTES # BLD: 2.5 K/UL (ref 0.9–3.6)
LYMPHOCYTES NFR BLD: 23 % (ref 21–52)
MCH RBC QN AUTO: 31.2 PG (ref 24–34)
MCHC RBC AUTO-ENTMCNC: 34.2 G/DL (ref 31–37)
MCV RBC AUTO: 91.1 FL (ref 74–97)
MONOCYTES # BLD: 0.7 K/UL (ref 0.05–1.2)
MONOCYTES NFR BLD: 7 % (ref 3–10)
NEUTS SEG # BLD: 7.7 K/UL (ref 1.8–8)
NEUTS SEG NFR BLD: 69 % (ref 40–73)
NITRITE UR QL STRIP.AUTO: NEGATIVE
PH UR STRIP: 6 [PH] (ref 5–8)
PLATELET # BLD AUTO: 228 K/UL (ref 135–420)
PMV BLD AUTO: 10.2 FL (ref 9.2–11.8)
POTASSIUM SERPL-SCNC: 3.3 MMOL/L (ref 3.5–5.5)
PROT UR STRIP-MCNC: NEGATIVE MG/DL
RBC # BLD AUTO: 4.04 M/UL (ref 4.2–5.3)
SODIUM SERPL-SCNC: 137 MMOL/L (ref 136–145)
SP GR UR REFRACTOMETRY: 1.02 (ref 1–1.03)
UROBILINOGEN UR QL STRIP.AUTO: 1 EU/DL (ref 0.2–1)
WBC # BLD AUTO: 11.1 K/UL (ref 4.6–13.2)

## 2021-04-10 PROCEDURE — 81003 URINALYSIS AUTO W/O SCOPE: CPT

## 2021-04-10 PROCEDURE — 74011250637 HC RX REV CODE- 250/637: Performed by: STUDENT IN AN ORGANIZED HEALTH CARE EDUCATION/TRAINING PROGRAM

## 2021-04-10 PROCEDURE — 74011250636 HC RX REV CODE- 250/636: Performed by: STUDENT IN AN ORGANIZED HEALTH CARE EDUCATION/TRAINING PROGRAM

## 2021-04-10 PROCEDURE — 85025 COMPLETE CBC W/AUTO DIFF WBC: CPT

## 2021-04-10 PROCEDURE — 99285 EMERGENCY DEPT VISIT HI MDM: CPT

## 2021-04-10 PROCEDURE — 96360 HYDRATION IV INFUSION INIT: CPT

## 2021-04-10 PROCEDURE — 80048 BASIC METABOLIC PNL TOTAL CA: CPT

## 2021-04-10 RX ORDER — DIPHENHYDRAMINE HCL 25 MG
25 CAPSULE ORAL
Status: COMPLETED | OUTPATIENT
Start: 2021-04-10 | End: 2021-04-10

## 2021-04-10 RX ORDER — METOCLOPRAMIDE 10 MG/1
10 TABLET ORAL
Status: COMPLETED | OUTPATIENT
Start: 2021-04-10 | End: 2021-04-10

## 2021-04-10 RX ORDER — POTASSIUM CHLORIDE 20 MEQ/1
40 TABLET, EXTENDED RELEASE ORAL
Status: COMPLETED | OUTPATIENT
Start: 2021-04-10 | End: 2021-04-10

## 2021-04-10 RX ORDER — ACETAMINOPHEN 325 MG/1
650 TABLET ORAL
Status: COMPLETED | OUTPATIENT
Start: 2021-04-10 | End: 2021-04-10

## 2021-04-10 RX ADMIN — SODIUM CHLORIDE 1000 ML: 900 INJECTION, SOLUTION INTRAVENOUS at 22:20

## 2021-04-10 RX ADMIN — POTASSIUM CHLORIDE 40 MEQ: 1500 TABLET, EXTENDED RELEASE ORAL at 23:59

## 2021-04-10 RX ADMIN — ACETAMINOPHEN 650 MG: 325 TABLET ORAL at 22:27

## 2021-04-10 RX ADMIN — DIPHENHYDRAMINE HYDROCHLORIDE 25 MG: 25 CAPSULE ORAL at 22:23

## 2021-04-10 RX ADMIN — METOCLOPRAMIDE 10 MG: 10 TABLET ORAL at 22:23

## 2021-04-10 NOTE — LETTER
NOTIFICATION RETURN TO WORK / SCHOOL 
 
4/10/2021 11:35 PM 
 
Ms. [address] 
[address] [place] [id number] To Whom It May Concern: 
 
Christal Mello is currently under the care of [facility] EMERGENCY DEPT. She can return to work/school on: 4/12 If there are questions or concerns please have the patient contact our office. Sincerely, Magno Cuevas MD

## 2021-04-11 VITALS
HEIGHT: 67 IN | TEMPERATURE: 98.7 F | SYSTOLIC BLOOD PRESSURE: 96 MMHG | RESPIRATION RATE: 16 BRPM | OXYGEN SATURATION: 100 % | HEART RATE: 68 BPM | WEIGHT: 157 LBS | BODY MASS INDEX: 24.64 KG/M2 | DIASTOLIC BLOOD PRESSURE: 60 MMHG

## 2021-04-11 NOTE — DISCHARGE INSTRUCTIONS
Please return to the ER with any new or worsening symptoms or any other concerns. Please return to the Emergency Department if you develop a fever, chills, cannot eat or drink due to nausea or vomiting, or if any of your symptoms worsen. Please follow up with Your OB Gyn    Also, It is extremely important that you follow-up with a primary care physician and if you do not have one currently use the contact information provided to obtain an appointment. If none was provided please call the number on the back of your insurance card to locate a Primary care doctor. Many offices have \"cancellation lists\" that you can ask to be placed on; should a patient with an earlier appointment cancel you will be notified to take their place. Please return to the Emergency Room immediately if your symptoms worsen. Please carefully read all discharge instructions    [email or web address]    What are GoodRx coupons? GoodRx coupons will help you pay less than the cash price for your prescription. [employer] free to use and are accepted at virtually every [place] pharmacy. Your pharmacist will know how to enter the codes on the coupon to pull up the lowest discount available.

## 2021-04-11 NOTE — ED TRIAGE NOTES
Pt reports that her head started to hurt since she woke up and got worse as the day went on. She reports that she also 16 weeks pregnant and didn't know what she could take for the headache that is now causing nausea and some vomiting.

## 2021-04-11 NOTE — ED NOTES
EMERGENCY DEPARTMENT HISTORY AND PHYSICAL EXAM      Date: 4/10/2021  Patient Name: Rolan Finch    History of Presenting Illness     Chief Complaint   Patient presents with    Migraine     nausea & vomiting       History Provided By: Patient    HPI:  Rolan Finch is a 19 y.o. female , gravid 16 weeks, follows with OB, who complains of headache, that began this am, has been intermittent, not abrupt in onset,  sweating this am, recent exposure to viral illness (non covid). She denies any urinary symptoms, no cough, no fever, mild congestion. The patient denies any aggravating or alleviating factors - and has not taken any medications in an attempt to alleviate her symptoms. PMH, PSH, family history, social history, allergies reviewed with the patient with significant items noted above. ---  Pregnancy - positive    PCP: Tanja Altman MD    Current Outpatient Medications   Medication Sig Dispense Refill    ondansetron (Zofran ODT) 4 mg disintegrating tablet 1 Tab by SubLINGual route every eight (8) hours as needed for Nausea or Vomiting. 20 Tab 0       Past History     Past Medical History:  Past Medical History:   Diagnosis Date    Ovarian cyst        Past Surgical History:  History reviewed. No pertinent surgical history. Family History:  History reviewed. No pertinent family history. Social History:  Social History     Tobacco Use    Smoking status: Current Every Day Smoker     Packs/day: 0.50    Smokeless tobacco: Never Used   Substance Use Topics    Alcohol use: No    Drug use: Never       Allergies:   Allergies   Allergen Reactions    Amoxicillin Hives and Swelling       Review of Systems   Review of Systems    In addition to that documented in the HPI above  All other review of systems negative    Constitutional: Denies fevers or chills  Eyes: Denies vision changes  ENMT: Denies sore throat  CV: Denies chest pain  Resp: Denies SOB  GI: Denies vomiting or diarrhea  : Denies painful urination  MSK: Denies recent trauma  Skin: Denies new rashes  Neuro: Denies new numbness or tingling or weakness  Endocrine: Denies polyuria  Heme: Denies bleeding disorders    Physical Exam     Vitals:    04/10/21 2203 04/10/21 2218 04/11/21 0003   BP: 119/75 103/61 96/60   Pulse: 90  68   Resp: 16  16   Temp: 98.7 °F (37.1 °C)     SpO2: 100% 100% 100%   Weight: 71.2 kg (157 lb)     Height: 5' 7\" (1.702 m)       Physical Exam    Nursing notes and vital signs reviewed    General: Patient is awake and alert, resting comfortably in no acute distress  Head: Normocephalic and atraumatic  Eyes: Extraocular muscles intact, no conjunctival pallor  Ear, nose, throat: Normal external exam  Neck: Normal range of motion  Cardiovascular: RRR, No murmur auscultated, warm, well-perfused extremities  Respiratory: Patient is in no respiratory distress, lungs CTAB  GI: soft, non-tender, non-distended  MSK: No gross deformities appreciated  Extremities: pulses intact with good cap refills, no LE pitting edema or calf tenderness  Skin: Warm, dry, and intact  Neuro: The patient is alert and oriented, no gross motor or sensory defects noted. Psych: Appropriate mood and affect. Medical Decision Making   I am the first provider for this patient. I reviewed the vital signs, available nursing notes, past medical history, past surgical history, family history and social history. Vital Signs-Reviewed the patient's vital signs. Visit Vitals  BP 96/60 (BP 1 Location: Left upper arm, BP Patient Position: At rest)   Pulse 68   Temp 98.7 °F (37.1 °C)   Resp 16   Ht 5' 7\" (1.702 m)   Wt 71.2 kg (157 lb)   LMP 12/17/2020   SpO2 100%   BMI 24.59 kg/m²       Pulse Oximetry Analysis - 100% on room air     Cardiac Monitor:  Rate: 90 bpm  Rhythm: NSR    Provider Notes (Medical Decision Making):   Antonio Craven is a 19 y.o. female with symptoms of migraine, some nausea and vomiting of pregnancy. Also possible gastritis. Patient is well appearing, nontoxic, reassuring exam   Based on patients history and physical exam I do not believe advanced imaging is indicated at this time. Lab work and PE remains benign, patients pain has resolved and is safe for discharge at this time, VSS, patient is comfortable with plan and will return should symptoms change    Patient treated  and rehydrated. Repeat exam is benign. The patient is, again, nontoxic - with a reassuring repeat abdominal exam.   Has remained hemodynamically stable throughout the ED course. Procedures:  Procedures    ED Course:   ED Course as of Apr 13 2130   Sat Apr 10, 2021   2255 Urinalysis w/o signs of infection    CBC without leukocytosis or anemia. No significant electrolyte abnormality on chemistry panel, potassium noted to be mildly low, will replete        [DM]   2321 11:23 PM   Feeling much better at this time. [DM]      ED Course User Index  [DM] Edin Jefferson MD      Vitals Review/addressed -     Diagnostic Study Results     Orders Placed This Encounter    URINALYSIS W/ RFLX MICROSCOPIC     Standing Status:   Standing     Number of Occurrences:   1    CBC WITH AUTOMATED DIFF     Standing Status:   Standing     Number of Occurrences:   1    BASIC METABOLIC PANEL     Standing Status:   Standing     Number of Occurrences:   1    sodium chloride 0.9 % bolus infusion 1,000 mL    metoclopramide HCl (REGLAN) tablet 10 mg    diphenhydrAMINE (BENADRYL) capsule 25 mg    acetaminophen (TYLENOL) tablet 650 mg    potassium chloride (K-DUR, KLOR-CON) SR tablet 40 mEq    DISCONTD: sodium chloride 0.9 % bolus infusion 1,000 mL       Labs -   No results found for this or any previous visit (from the past 12 hour(s)). Radiologic Studies -   No orders to display     CT Results  (Last 48 hours)    None        CXR Results  (Last 48 hours)    None          Disposition     Disposition:  Home    CLINICAL IMPRESSION:    1.  Nonintractable headache, unspecified chronicity pattern, unspecified headache type    2. Hypokalemia    3. 16 weeks gestation of pregnancy        It should be noted that I will be the provider of record for this patient  Yari Medina MD    Follow-up Information     Follow up With Specialties Details Why [address]    [facility] EMERGENCY DEPT Emergency Medicine Go to  If symptoms worsen [address] [place] [address]  [phone]          Discharge Medication List as of 4/10/2021 11:35 PM          Please note that this dictation was completed with [employer], the [employer] voice recognition software. Quite often unanticipated grammatical, syntax, homophones, and other interpretive errors are inadvertently transcribed by the computer software. Please disregard these errors. Please excuse any errors that have escaped final proofreading.

## 2021-05-31 ENCOUNTER — HOSPITAL ENCOUNTER (OUTPATIENT)
Age: 21
Discharge: HOME | End: 2021-05-31
Attending: OBSTETRICS & GYNECOLOGY | Admitting: OBSTETRICS & GYNECOLOGY
Payer: COMMERCIAL

## 2021-05-31 VITALS
SYSTOLIC BLOOD PRESSURE: 112 MMHG | HEART RATE: 79 BPM | DIASTOLIC BLOOD PRESSURE: 70 MMHG | BODY MASS INDEX: 25.58 KG/M2 | HEIGHT: 67 IN | TEMPERATURE: 98.8 F | RESPIRATION RATE: 16 BRPM | WEIGHT: 163 LBS

## 2021-05-31 PROBLEM — M54.9 BACK PAIN: Status: ACTIVE | Noted: 2021-05-31

## 2021-05-31 PROBLEM — Z3A.23 23 WEEKS GESTATION OF PREGNANCY: Status: ACTIVE | Noted: 2021-05-31

## 2021-05-31 LAB
APPEARANCE UR: ABNORMAL
BILIRUB UR QL: NEGATIVE
COLOR UR: ABNORMAL
GLUCOSE UR QL STRIP.AUTO: NEGATIVE MG/DL
KETONES UR-MCNC: NEGATIVE MG/DL
LEUKOCYTE ESTERASE UR QL STRIP: ABNORMAL
NITRITE UR QL: NEGATIVE
PH UR: 6 [PH] (ref 5–9)
PROT UR QL: NEGATIVE MG/DL
RBC # UR STRIP: ABNORMAL /UL
SERVICE CMNT-IMP: ABNORMAL
SP GR UR: >1.03 (ref 1–1.02)
UROBILINOGEN UR QL: 0.2 EU/DL (ref 0.2–1)

## 2021-05-31 PROCEDURE — 96360 HYDRATION IV INFUSION INIT: CPT

## 2021-05-31 PROCEDURE — 81003 URINALYSIS AUTO W/O SCOPE: CPT

## 2021-05-31 PROCEDURE — 99282 EMERGENCY DEPT VISIT SF MDM: CPT

## 2021-05-31 PROCEDURE — 74011250636 HC RX REV CODE- 250/636: Performed by: OBSTETRICS & GYNECOLOGY

## 2021-05-31 RX ADMIN — SODIUM CHLORIDE, SODIUM LACTATE, POTASSIUM CHLORIDE, AND CALCIUM CHLORIDE 1000 ML: 600; 310; 30; 20 INJECTION, SOLUTION INTRAVENOUS at 18:29

## 2021-05-31 NOTE — DISCHARGE INSTRUCTIONS
FOLLOW UP WITH OB TOMORROW, Tuesday June 1  Take TYLENOL 1000MG (2 Extra Strength) every 6 hours as needed for pain   Apply heat and/or ice 20 minutes on 20 minutes off    Keep all scheduled appointments  Eat multiple meals a day and drink plenty of fluids throughout the day  Come back to L&D if your water breaks, have bright red vaginal bleeding, decreased fetal movement and/or regular contractions increasing in intensity. Back Pain During Pregnancy: Care Instructions  Overview     Back pain has many possible causes. It is often caused by problems with muscles and ligaments in your back. The extra weight during pregnancy can put stress on your back. Moving, lifting, standing, sitting, or sleeping in an awkward way also can strain your back. Back pain can also be a sign of labor. Although it may hurt a lot, back pain often improves on its own. Use good home treatment, and take care not to stress your back. Follow-up care is a key part of your treatment and safety. Be sure to make and go to all appointments, and call your doctor if you are having problems. It's also a good idea to know your test results and keep a list of the medicines you take. How can you care for yourself at home? · Ask your doctor about taking acetaminophen (Tylenol) for pain. Do not take aspirin, ibuprofen (Advil, Motrin), or naproxen (Aleve). · Do not take two or more pain medicines at the same time unless the doctor told you to. Many pain medicines have acetaminophen, which is Tylenol. Too much acetaminophen (Tylenol) can be harmful. · Lie on your side with your knees and hips bent and a pillow between your legs. This reduces stress on your back. · Put ice or cold packs on your back for 10 to 20 minutes at a time, several times a day. Put a thin cloth between the ice and your skin. · Warm baths may also help reduce pain. · Change positions every 30 minutes. Take breaks if you must sit for a long time.  Get up and walk around. · Ask your doctor about how much exercise you can do. You may feel better taking short walks or doing gentle movements and stretching in a swimming pool. · Ask your doctor about exercises to stretch and strengthen your back. When should you call for help? Call your doctor now or seek immediate medical care if:    · You think you are in labor.     · You have new numbness in your buttocks, genital or rectal areas, or legs.     · You have a new loss of bowel or bladder control. Watch closely for changes in your health, and be sure to contact your doctor if:    · You do not get better as expected. Where can you learn more? Go to http://stevanOvertonejose.info/  Enter Z647 in the search box to learn more about \"Back Pain During Pregnancy: Care Instructions. \"  Current as of: October 8, 2020               Content Version: 12.8  © 8684-6871 [employer]. Care instructions adapted under license by [employer] (which disclaims liability or warranty for this information). If you have questions about a medical condition or this instruction, always ask your healthcare professional. [place] [address] any warranty or liability for your use of this information. Weeks 22 to 26 of Your Pregnancy: Care Instructions  Overview     As you enter your 7th month of pregnancy at week 26, your baby's lungs are growing stronger and getting ready to breathe. You may notice that your baby responds to the sound of your or your partner's voice. You may also notice that your baby does less turning and twisting and more squirming, kicking, or jerking. Jerking often means that your baby has hiccups. Hiccups are normal and are only temporary. You may want to think about attending a childbirth preparation class. This is also a good time to start thinking about whether you want to have pain medicine during labor.   Most pregnant women are tested for gestational diabetes between weeks 24 and 28. Gestational diabetes occurs when your blood sugar level gets too high when you're pregnant. The test is important, because you can have gestational diabetes and not know it. But the condition can cause problems for your baby. Follow-up care is a key part of your treatment and safety. Be sure to make and go to all appointments, and call your doctor if you are having problems. It's also a good idea to know your test results and keep a list of the medicines you take. How can you care for yourself at home? Ease discomfort from your baby's kicking  · Change your position. Sometimes this will cause your baby to change position too. · Take a deep breath while you raise your arm over your head. Then breathe out while you drop your arm. Do Kegel exercises to prevent urine from leaking  · You can do Kegel exercises while you stand or sit. ? Squeeze the same muscles you would use to stop your urine. Your belly and thighs should not move. ? Hold the squeeze for 3 seconds, and then relax for 3 seconds. ? Start with 3 seconds. Then add 1 second each week until you are able to squeeze for 10 seconds. ? Repeat the exercise 10 to 15 times for each session. Do three or more sessions each day. Ease or reduce swelling in your feet, ankles, hands, and fingers  · If your fingers are puffy, take off your rings. · Do not eat high-salt foods, such as potato chips. · Prop up your feet on a stool or couch as much as possible. Sleep with pillows under your feet. · Do not stand for long periods of time or wear tight shoes. · Wear support stockings. Where can you learn more? Go to http://www.elliott.com/  Enter G264 in the search box to learn more about \"Weeks 22 to 26 of Your Pregnancy: Care Instructions. \"  Current as of: October 8, 2020               Content Version: 12.8  © 4207-5660 [employer].    Care instructions adapted under license by [employer] (which disclaims liability or warranty for this information). If you have questions about a medical condition or this instruction, always ask your healthcare professional. [address] any warranty or liability for your use of this information.

## 2021-05-31 NOTE — PROGRESS NOTES
54  23+4 weeks gestation with c/o low back pain radiating to right side. Pt states it started about 24 hours. Pt taken to triage 3, oriented to area, call bell in reach. Request for urine sample. 5 Paged Dr. Jong Stern return page rec'd. New order for fluids, cervical exam and follow up with primary OB tomorrow.  SVE Closed/thick/high     pt rec'd discharge instructions. Verbalized understanding, signed and ambulated off unit.

## 2021-11-18 ENCOUNTER — HOSPITAL ENCOUNTER (EMERGENCY)
Age: 21
Discharge: HOME | End: 2021-11-18
Attending: STUDENT IN AN ORGANIZED HEALTH CARE EDUCATION/TRAINING PROGRAM
Payer: COMMERCIAL

## 2021-11-18 VITALS
DIASTOLIC BLOOD PRESSURE: 67 MMHG | HEART RATE: 80 BPM | OXYGEN SATURATION: 97 % | RESPIRATION RATE: 16 BRPM | SYSTOLIC BLOOD PRESSURE: 109 MMHG | TEMPERATURE: 99.3 F

## 2021-11-18 DIAGNOSIS — L02.91 ABSCESS: Primary | ICD-10-CM

## 2021-11-18 PROCEDURE — 99282 EMERGENCY DEPT VISIT SF MDM: CPT

## 2021-11-18 PROCEDURE — 75810000289 HC I&D ABSCESS SIMP/COMP/MULT

## 2021-11-18 RX ORDER — SULFAMETHOXAZOLE AND TRIMETHOPRIM 800; 160 MG/1; MG/1
2 TABLET ORAL 2 TIMES DAILY
Qty: 40 TABLET | Refills: 0 | Status: SHIPPED | OUTPATIENT
Start: 2021-11-18 | End: 2021-11-28

## 2021-11-18 NOTE — ED PROVIDER NOTES
EMERGENCY DEPARTMENT HISTORY AND PHYSICAL EXAM    Date: 11/18/2021  Patient Name: Harshil Unger    History of Presenting Illness     Chief Complaint   Patient presents with    Skin Problem         History Provided By: Patient    Chief Complaint: abscess       Additional History (Context):   3:44 PM  Harshil Unger is a 19 y.o. female presents to the emergency department C/O abscess to the right buttock for the past week. States it was there 3 weeks ago but popped on its own but it came back 1 week ago. It has not started draining at this time. No fevers. Does not extend to the perianal area. She has no other medical problems. No history of similar. PCP: Anupama, MD Tanja    Current Outpatient Medications   Medication Sig Dispense Refill    trimethoprim-sulfamethoxazole (Bactrim DS) 160-800 mg per tablet Take 2 Tablets by mouth two (2) times a day for 10 days. 40 Tablet 0    prenatal 59/ARDD fum/folic/dha (PRENATAL-1 PO) Take 1 Tablet by mouth daily.  ondansetron (Zofran ODT) 4 mg disintegrating tablet 1 Tab by SubLINGual route every eight (8) hours as needed for Nausea or Vomiting. 20 Tab 0       Past History     Past Medical History:  Past Medical History:   Diagnosis Date    Ovarian cyst        Past Surgical History:  No past surgical history on file. Family History:  No family history on file. Social History:  Social History     Tobacco Use    Smoking status: Current Every Day Smoker     Packs/day: 0.50    Smokeless tobacco: Never Used   Vaping Use    Vaping Use: Never used   Substance Use Topics    Alcohol use: No    Drug use: Never       Allergies: Allergies   Allergen Reactions    Amoxicillin Hives and Swelling       Review of Systems   Review of Systems   Constitutional: Negative for chills and fever. Respiratory: Negative for shortness of breath. Cardiovascular: Negative for chest pain. Musculoskeletal: Negative for back pain. Skin: Positive for wound. Neurological: Negative for weakness and numbness. All other systems reviewed and are negative. Physical Exam     Vitals:    11/18/21 1508   BP: 109/67   Pulse: 80   Resp: 16   Temp: 99.3 °F (37.4 °C)   SpO2: 97%     Physical Exam  Vitals and nursing note reviewed. Constitutional:       Appearance: She is well-developed. Comments: Alert well-appearing nontoxic no acute distress   HENT:      Head: Normocephalic and atraumatic. Cardiovascular:      Rate and Rhythm: Normal rate and regular rhythm. Heart sounds: Normal heart sounds. No murmur heard. Pulmonary:      Effort: Pulmonary effort is normal. No respiratory distress. Breath sounds: Normal breath sounds. No wheezing or rales. Abdominal:      General: Bowel sounds are normal.      Palpations: Abdomen is soft. Tenderness: There is no abdominal tenderness. Genitourinary:     Comments: Right buttock at the gluteal cleft to centimeter round area of erythema and induration with central area of fluctuance unable to express any fluid  Induration does not extend to the perianal area  Musculoskeletal:      Cervical back: Normal range of motion and neck supple. Neurological:      Mental Status: She is alert and oriented to person, place, and time. Psychiatric:         Judgment: Judgment normal.         Diagnostic Study Results     Labs:   No results found for this or any previous visit (from the past 12 hour(s)). Radiologic Studies:   No orders to display     CT Results  (Last 48 hours)    None        CXR Results  (Last 48 hours)    None          Medical Decision Making   I am the first provider for this patient. I reviewed the vital signs, available nursing notes, past medical history, past surgical history, family history and social history. Vital Signs: Reviewed the patient's vital signs.     Pulse Oximetry Analysis: 97% on RA       Records Reviewed: Nursing Notes and Old Medical Records    Procedures:  I&D Abcess Complex    Date/Time: 11/18/2021 4:17 PM  Performed by: LETICIA Guzman  Authorized by: Mello Nicolas MD     Consent:     Consent obtained:  Verbal    Consent given by:  Patient    Risks discussed:  Bleeding, incomplete drainage, infection and pain    Alternatives discussed:  No treatment  Location:     Type:  Abscess    Location:  Anogenital    Anogenital location:  Gluteal cleft  Pre-procedure details:     Skin preparation:  Betadine  Anesthesia (see MAR for exact dosages): Anesthesia method:  Local infiltration    Local anesthetic:  Lidocaine 1% w/o epi  Procedure type:     Complexity:  Complex  Procedure details:     Needle aspiration: no      Incision types:  Single straight    Incision depth:  Subcutaneous    Scalpel blade:  11    Wound management:  Probed and deloculated, irrigated with saline and extensive cleaning    Drainage:  Bloody and purulent    Drainage amount: Moderate    Wound treatment:  Wound left open    Packing materials:  None  Post-procedure details:     Patient tolerance of procedure: Tolerated well, no immediate complications        ED Course:   3:44 PM Initial assessment performed. The patients presenting problems have been discussed, and they are in agreement with the care plan formulated and outlined with them. I have encouraged them to ask questions as they arise throughout their visit. Discussion:  Pt presents with buttock abscess that was I&D. Does not extend to the perianal area. She is afebrile nontoxic with no other medical problems. Area cleaned. No packing used. Will start on antibiotics as patient is not breast-feeding at this time. . Strict return precautions given, pt offering no questions or complaints. Diagnosis and Disposition     DISCHARGE NOTE:  Audrey Shi  results have been reviewed with her. She has been counseled regarding her diagnosis, treatment, and plan.   She verbally conveys understanding and agreement of the signs, symptoms, diagnosis, treatment and prognosis and additionally agrees to follow up as discussed. She also agrees with the care-plan and conveys that all of her questions have been answered. I have also provided discharge instructions for her that include: educational information regarding their diagnosis and treatment, and list of reasons why they would want to return to the ED prior to their follow-up appointment, should her condition change. She has been provided with education for proper emergency department utilization. CLINICAL IMPRESSION:    1. Abscess        PLAN:  1. D/C Home  2. Current Discharge Medication List      START taking these medications    Details   trimethoprim-sulfamethoxazole (Bactrim DS) 160-800 mg per tablet Take 2 Tablets by mouth two (2) times a day for 10 days. Qty: 40 Tablet, Refills: 0  Start date: 11/18/2021, End date: 11/28/2021           3. Follow-up Information     Follow up With Specialties Details Why Contact Info    [address] [address] [place]  Schedule an appointment as soon as possible for a visit   [address] [facility], [address] [place] [address] [address] [place] [address],[address]    [facility] EMERGENCY DEPT Emergency Medicine  If symptoms worsen [address] [facility] [address]  [phone]                 Please note that this dictation was completed with [employer], the computer voice recognition software. Quite often unanticipated grammatical, syntax, homophones, and other interpretive errors are inadvertently transcribed by the computer software. Please disregard these errors. Please excuse any errors that have escaped final proofreading.

## 2022-03-19 PROBLEM — M54.9 BACK PAIN: Status: ACTIVE | Noted: 2021-05-31

## 2022-03-19 PROBLEM — Z3A.23 23 WEEKS GESTATION OF PREGNANCY: Status: ACTIVE | Noted: 2021-05-31

## 2023-03-24 ENCOUNTER — HOSPITAL ENCOUNTER (EMERGENCY)
Facility: HOSPITAL | Age: 23
Discharge: HOME | End: 2023-03-24
Attending: STUDENT IN AN ORGANIZED HEALTH CARE EDUCATION/TRAINING PROGRAM

## 2023-03-24 VITALS
BODY MASS INDEX: 22.76 KG/M2 | HEIGHT: 67 IN | TEMPERATURE: 97.5 F | HEART RATE: 63 BPM | RESPIRATION RATE: 18 BRPM | WEIGHT: 145 LBS | OXYGEN SATURATION: 100 %

## 2023-03-24 NOTE — ED TRIAGE NOTES
Pt arrived with c/o swelling to the right side of her neck behind her ear that is sore to the touch. Pt states she noted a bump on her ear lobe 1.5 weeks ago and yesterday noted it was gone but has a bump behind her ear. Pt is alert and oriented x4.  Vital signs are stable

## 2023-05-16 RX ORDER — ONDANSETRON 4 MG/1
TABLET, ORALLY DISINTEGRATING ORAL EVERY 8 HOURS PRN
COMMUNITY
Start: 2021-01-27